# Patient Record
Sex: MALE | Race: BLACK OR AFRICAN AMERICAN | NOT HISPANIC OR LATINO | Employment: FULL TIME | URBAN - METROPOLITAN AREA
[De-identification: names, ages, dates, MRNs, and addresses within clinical notes are randomized per-mention and may not be internally consistent; named-entity substitution may affect disease eponyms.]

---

## 2017-04-25 RX ORDER — AMLODIPINE BESYLATE 10 MG/1
10 TABLET ORAL DAILY
Qty: 90 TABLET | Refills: 0 | Status: SHIPPED | OUTPATIENT
Start: 2017-04-25 | End: 2017-07-23 | Stop reason: SDUPTHER

## 2017-07-23 RX ORDER — AMLODIPINE BESYLATE 10 MG/1
TABLET ORAL
Qty: 90 TABLET | Refills: 0 | Status: SHIPPED | OUTPATIENT
Start: 2017-07-23 | End: 2017-08-10 | Stop reason: SDUPTHER

## 2017-07-24 RX ORDER — TADALAFIL 5 MG/1
TABLET, FILM COATED ORAL
Qty: 30 TABLET | Refills: 0 | Status: SHIPPED | OUTPATIENT
Start: 2017-07-24 | End: 2018-08-31 | Stop reason: SDUPTHER

## 2017-07-24 NOTE — TELEPHONE ENCOUNTER
Patient notified that an OV with his new PCP is required to obtain future refills.  Verbalized understanding and asked that his prescriptions be sent for a 30-day supply as his insurance does not cover prescriptions written for 90 days.   Sary, pharmacist, states that the patient picked up 30 tablets of amlodipine today due to insurance limitations.  She states that the patient will be able to obtain the remainder of the pills on his prescription in 30 day increments without a new order being submitted.

## 2017-08-09 NOTE — PROGRESS NOTES
This note was created by combination of typed  and Dragon dictation.  Transcription errors may be present.  If there are any questions, please contact me.    Assessment & Plan  Normal physical exam  -     Urinalysis; Future; Expected date: 08/10/2017  -     Comprehensive metabolic panel; Future; Expected date: 08/10/2017  -     Lipid panel; Future; Expected date: 08/10/2017    Vitiligo - managed by dermatology    Microhematuria-long-standing per the patient.  Has had CT urogram back 2008 negative, reports a history of cystoscopy probably around then though I don't have the final report.  Most recent was seen by urology.  He notes that this has been ongoing long-standing.  I favor that if this is stable I would just monitor.  Check urinalysis.  If red blood cells greater than 10 I would have him return to urology.    Essential hypertension-stable, refilled amlodipine, Hyzaar.  -     amlodipine (NORVASC) 10 MG tablet; Take 1 tablet (10 mg total) by mouth once daily.  Dispense: 90 tablet; Refill: 3  -     losartan-hydrochlorothiazide 100-25 mg (HYZAAR) 100-25 mg per tablet; Take 1 tablet by mouth once daily.  Dispense: 90 tablet; Refill: 3    Hordeolum externum of left lower eyelid-heat packs, expect slow but steady improvement.    Need for diphtheria-tetanus-pertussis (Tdap) vaccine, adult/adolescent  -     Tdap Vaccine        Medications Discontinued During This Encounter   Medication Reason    hydrochlorothiazide (HYDRODIURIL) 25 MG tablet Duplicate Order    losartan (COZAAR) 100 MG tablet Duplicate Order    doxazosin (CARDURA) 2 MG tablet     amlodipine (NORVASC) 10 MG tablet Reorder    losartan-hydrochlorothiazide 100-25 mg (HYZAAR) 100-25 mg per tablet Reorder       Follow-up: No Follow-up on file. if all normal plan for physical exam 1 year      =================================================================      Chief Complaint   Patient presents with    Annual Exam       ARACELI Abarca is a 41  y.o. male, last appointment with this clinic was Visit date not found.    Former pt of Dr. Baker and Dr. Arellano  HTN, amlodipine, hyzaar, takes regularly in the AM.  Checks daily.  Home cuff.  A little better than what we get on intake today.  Some white coat syndrome. Hx of doxazosin, was discontinued.   Obesity  Microhematuria, since college per the pt.  Hx of cystoscopy prior to his OV with Dr. Edwards.  11/24/2008 CT urogram likely kidney cysts.    Reports cystoscopy 2008 was unremarkable.  I don't see the report.    Stye on the left lower lid, x days.  Irritating but no vision changes.  Bumps on the posterior scalp.  Sees dermatology annually for vitiligo but overdue.     Patient Care Team:  Demarcus Nieves MD as PCP - General (Internal Medicine)  Shaniqua Edwards MD as Consulting Physician (Urology)  Erick Back MD as Consulting Physician (Dermatology)    Patient Active Problem List    Diagnosis Date Noted    Microhematuria 08/10/2017     11/24/2008 CT urogram likely kidney cysts.    Reports cystoscopy 2008 was unremarkable.  I don't see the report.      Essential hypertension 03/17/2013    Family history of diabetes mellitus 03/17/2013    Metatarsalgia 03/17/2013    Vitiligo 03/17/2013     Derm Dr. Back         PAST MEDICAL HISTORY:  Past Medical History:   Diagnosis Date    Hypertension        PAST SURGICAL HISTORY:  Past Surgical History:   Procedure Laterality Date    knee arthro Right     hx of meniscal tear     Family History   Problem Relation Age of Onset    Hypertension Mother     Hypertension Father     Melanoma Father     Diabetes Sister     Hypertension Brother     Benign prostatic hyperplasia Brother     Thyroid disease Sister     No Known Problems Son     No Known Problems Son     Autism Son        SOCIAL HISTORY:  Social History     Social History    Marital status:      Spouse name: N/A    Number of children: N/A    Years of education: N/A      Occupational History    [employer] - meter      Social History Main Topics    Smoking status: Never Smoker    Smokeless tobacco: Never Used    Alcohol use 0.0 oz/week      Comment: social    Drug use: No    Sexual activity: Yes     Partners: Female     Other Topics Concern    Not on file     Social History Narrative    [employer] graduate, he had played on the basketball team, and has worked offshore in safety and other capacities.         ALLERGIES AND MEDICATIONS: updated and reviewed.  Review of patient's allergies indicates:  No Known Allergies  Current Outpatient Prescriptions   Medication Sig Dispense Refill    amlodipine (NORVASC) 10 MG tablet Take 1 tablet (10 mg total) by mouth once daily. 90 tablet 3    CIALIS 5 mg tablet TAKE 1 TABLET BY MOUTH ONCE DAILY 30 tablet 0    losartan-hydrochlorothiazide 100-25 mg (HYZAAR) 100-25 mg per tablet Take 1 tablet by mouth once daily. 90 tablet 3     No current facility-administered medications for this visit.        Review of Systems   Constitutional: Negative for fever, malaise/fatigue and weight loss.   HENT: Negative for congestion.         History of present illness   Eyes: Negative for blurred vision and pain.   Respiratory: Negative for shortness of breath and wheezing.    Cardiovascular: Negative for chest pain, palpitations and leg swelling.   Gastrointestinal: Negative for abdominal pain, blood in stool, constipation, diarrhea and heartburn.   Genitourinary: Negative for dysuria, hematuria and urgency.   Musculoskeletal: Negative for joint pain.   Skin: Positive for rash.        Vitiligo followed by dermatology  Gets occasional dermatitis on his legs  Gets papules on the back of his scalp to get inflamed sometimes.  Has had steroid injections done with dermatology in the past.   Neurological: Negative for tingling, focal weakness, weakness and headaches.   Psychiatric/Behavioral: Negative for depression. The patient is not nervous/anxious.   "      Physical Exam   Vitals:    08/10/17 1049   BP: 134/84   Pulse: 78   Temp: 98 °F (36.7 °C)   SpO2: 99%   Weight: 132.2 kg (291 lb 7.2 oz)   Height: 6' 6" (1.981 m)    Body mass index is 33.68 kg/m².  Weight: 132.2 kg (291 lb 7.2 oz)   Height: 6' 6" (198.1 cm)     Physical Exam   Constitutional: He is oriented to person, place, and time. He appears well-developed and well-nourished.   HENT:   Right Ear: Tympanic membrane and external ear normal.   Left Ear: Tympanic membrane and external ear normal.   Nose: Nose normal.   Mouth/Throat: Oropharynx is clear and moist.   Eyes: EOM are normal.   The left eye lower lid on the border with small area of erythema, small little papule, no exudate.  The inside of the lid, with erythema and a small patch and very small area of induration.  The scleral conjunctiva is unremarkable   Neck: Trachea normal. Carotid bruit is not present. No thyroid mass and no thyromegaly present.   Cardiovascular: Normal rate, regular rhythm, S1 normal, S2 normal, normal heart sounds and intact distal pulses.    No murmur heard.  Pulmonary/Chest: Effort normal and breath sounds normal.   Abdominal: Soft. He exhibits no mass. There is no splenomegaly or hepatomegaly. There is no tenderness.   Genitourinary: Rectum normal and prostate normal.   Musculoskeletal: He exhibits no edema or deformity.   Lymphadenopathy:     He has no cervical adenopathy.     He has no axillary adenopathy.   Neurological: He is alert and oriented to person, place, and time. He has normal strength and normal reflexes. No cranial nerve deficit or sensory deficit.   Skin: Skin is warm and dry. No rash (on exposed skin) noted.   On exposed skin scattered patches of vitiligo  Posterior scalp with small papules nonfluctuant nontender noninflamed, several that are scattered, at max diameter 1 cm     Psychiatric: He has a normal mood and affect. His speech is normal and behavior is normal. Thought content normal.     "

## 2017-08-10 ENCOUNTER — LAB VISIT (OUTPATIENT)
Dept: LAB | Facility: HOSPITAL | Age: 42
End: 2017-08-10
Attending: INTERNAL MEDICINE
Payer: COMMERCIAL

## 2017-08-10 ENCOUNTER — OFFICE VISIT (OUTPATIENT)
Dept: FAMILY MEDICINE | Facility: CLINIC | Age: 42
End: 2017-08-10
Payer: COMMERCIAL

## 2017-08-10 VITALS
HEART RATE: 78 BPM | BODY MASS INDEX: 33.72 KG/M2 | HEIGHT: 78 IN | WEIGHT: 291.44 LBS | DIASTOLIC BLOOD PRESSURE: 84 MMHG | OXYGEN SATURATION: 99 % | TEMPERATURE: 98 F | SYSTOLIC BLOOD PRESSURE: 134 MMHG

## 2017-08-10 DIAGNOSIS — Z00.00 NORMAL PHYSICAL EXAM: Primary | ICD-10-CM

## 2017-08-10 DIAGNOSIS — Z00.00 NORMAL PHYSICAL EXAM: ICD-10-CM

## 2017-08-10 DIAGNOSIS — I10 ESSENTIAL HYPERTENSION: ICD-10-CM

## 2017-08-10 DIAGNOSIS — H00.015 HORDEOLUM EXTERNUM OF LEFT LOWER EYELID: ICD-10-CM

## 2017-08-10 DIAGNOSIS — R31.29 MICROHEMATURIA: ICD-10-CM

## 2017-08-10 DIAGNOSIS — Z23 NEED FOR DIPHTHERIA-TETANUS-PERTUSSIS (TDAP) VACCINE, ADULT/ADOLESCENT: ICD-10-CM

## 2017-08-10 DIAGNOSIS — L80 VITILIGO: ICD-10-CM

## 2017-08-10 LAB
ALBUMIN SERPL BCP-MCNC: 4 G/DL
ALP SERPL-CCNC: 56 U/L
ALT SERPL W/O P-5'-P-CCNC: 16 U/L
ANION GAP SERPL CALC-SCNC: 10 MMOL/L
AST SERPL-CCNC: 16 U/L
BILIRUB SERPL-MCNC: 1.3 MG/DL
BUN SERPL-MCNC: 13 MG/DL
CALCIUM SERPL-MCNC: 9.4 MG/DL
CHLORIDE SERPL-SCNC: 103 MMOL/L
CHOLEST/HDLC SERPL: 3.4 {RATIO}
CO2 SERPL-SCNC: 25 MMOL/L
CREAT SERPL-MCNC: 1 MG/DL
EST. GFR  (AFRICAN AMERICAN): >60 ML/MIN/1.73 M^2
EST. GFR  (NON AFRICAN AMERICAN): >60 ML/MIN/1.73 M^2
GLUCOSE SERPL-MCNC: 93 MG/DL
HDL/CHOLESTEROL RATIO: 29.2 %
HDLC SERPL-MCNC: 219 MG/DL
HDLC SERPL-MCNC: 64 MG/DL
LDLC SERPL CALC-MCNC: 135.2 MG/DL
NONHDLC SERPL-MCNC: 155 MG/DL
POTASSIUM SERPL-SCNC: 3.9 MMOL/L
PROT SERPL-MCNC: 7.2 G/DL
SODIUM SERPL-SCNC: 138 MMOL/L
TRIGL SERPL-MCNC: 99 MG/DL

## 2017-08-10 PROCEDURE — 36415 COLL VENOUS BLD VENIPUNCTURE: CPT | Mod: PO

## 2017-08-10 PROCEDURE — 99396 PREV VISIT EST AGE 40-64: CPT | Mod: 25,S$GLB,, | Performed by: INTERNAL MEDICINE

## 2017-08-10 PROCEDURE — 80061 LIPID PANEL: CPT

## 2017-08-10 PROCEDURE — 99999 PR PBB SHADOW E&M-EST. PATIENT-LVL III: CPT | Mod: PBBFAC,,, | Performed by: INTERNAL MEDICINE

## 2017-08-10 PROCEDURE — 90471 IMMUNIZATION ADMIN: CPT | Mod: S$GLB,,, | Performed by: INTERNAL MEDICINE

## 2017-08-10 PROCEDURE — 80053 COMPREHEN METABOLIC PANEL: CPT

## 2017-08-10 PROCEDURE — 90715 TDAP VACCINE 7 YRS/> IM: CPT | Mod: S$GLB,,, | Performed by: INTERNAL MEDICINE

## 2017-08-10 RX ORDER — AMLODIPINE BESYLATE 10 MG/1
10 TABLET ORAL DAILY
Qty: 90 TABLET | Refills: 3 | Status: SHIPPED | OUTPATIENT
Start: 2017-08-10 | End: 2017-10-16

## 2017-08-10 RX ORDER — LOSARTAN POTASSIUM AND HYDROCHLOROTHIAZIDE 25; 100 MG/1; MG/1
1 TABLET ORAL DAILY
Qty: 90 TABLET | Refills: 3 | Status: SHIPPED | OUTPATIENT
Start: 2017-08-10 | End: 2018-08-27 | Stop reason: SDUPTHER

## 2017-08-15 NOTE — PROGRESS NOTES
UA with 2 RBCs.  Has had workup for microhematuria in the past.   CMP WNL  FLP WNL  No changes.  PEx 1 year.

## 2017-10-16 RX ORDER — AMLODIPINE BESYLATE 10 MG/1
TABLET ORAL
Qty: 90 TABLET | Refills: 0 | Status: SHIPPED | OUTPATIENT
Start: 2017-10-16 | End: 2018-01-16 | Stop reason: SDUPTHER

## 2018-01-16 RX ORDER — AMLODIPINE BESYLATE 10 MG/1
TABLET ORAL
Qty: 90 TABLET | Refills: 1 | Status: SHIPPED | OUTPATIENT
Start: 2018-01-16 | End: 2018-07-16 | Stop reason: SDUPTHER

## 2018-07-16 DIAGNOSIS — I10 ESSENTIAL HYPERTENSION: Primary | ICD-10-CM

## 2018-07-16 RX ORDER — AMLODIPINE BESYLATE 10 MG/1
TABLET ORAL
Qty: 60 TABLET | Refills: 0 | Status: SHIPPED | OUTPATIENT
Start: 2018-07-16 | End: 2018-08-31 | Stop reason: SDUPTHER

## 2018-07-16 NOTE — TELEPHONE ENCOUNTER
Two months provided patient is due for his annual exam with Dr. Nieves in August, please schedule.    Thanks!  AMAURY Camara

## 2018-08-14 DIAGNOSIS — I10 ESSENTIAL HYPERTENSION: ICD-10-CM

## 2018-08-14 RX ORDER — AMLODIPINE BESYLATE 10 MG/1
TABLET ORAL
Qty: 90 TABLET | Refills: 0 | Status: SHIPPED | OUTPATIENT
Start: 2018-08-14 | End: 2018-08-31 | Stop reason: SDUPTHER

## 2018-08-14 NOTE — TELEPHONE ENCOUNTER
Left a message letting the patient know that his refill had been approved and before his next refill he will have to schedule an OV and labs.

## 2018-08-27 DIAGNOSIS — I10 ESSENTIAL HYPERTENSION: ICD-10-CM

## 2018-08-27 RX ORDER — LOSARTAN POTASSIUM AND HYDROCHLOROTHIAZIDE 25; 100 MG/1; MG/1
TABLET ORAL
Qty: 90 TABLET | Refills: 0 | Status: SHIPPED | OUTPATIENT
Start: 2018-08-27 | End: 2018-08-31 | Stop reason: SDUPTHER

## 2018-08-30 NOTE — PROGRESS NOTES
This note was created by combination of typed  and Dragon dictation.  Transcription errors may be present.  If there are any questions, please contact me.    Assessment & Plan:   Normal physical exam    Essential hypertension  -stable on current regimen, good blood pressure today, but no changes, refilled losartan HCT and amlodipine to pharmacy.  Check lipid, metabolic panel  -     Comprehensive metabolic panel; Future; Expected date: 08/31/2018  -     Lipid panel; Future; Expected date: 08/31/2018  -     losartan-hydrochlorothiazide 100-25 mg (HYZAAR) 100-25 mg per tablet; Take 1 tablet by mouth once daily.  Dispense: 90 tablet; Refill: 3  -     amLODIPine (NORVASC) 10 MG tablet; Take 1 tablet (10 mg total) by mouth once daily.  Dispense: 90 tablet; Refill: 3    Male erectile disorder  -takes Cialis on an as-needed basis, refill to pharmacy.  -     tadalafil (CIALIS) 5 MG tablet; Take 1 tablet (5 mg total) by mouth once daily.  Dispense: 30 tablet; Refill: 11        Medications Discontinued During This Encounter   Medication Reason    amLODIPine (NORVASC) 10 MG tablet Duplicate Order    losartan-hydrochlorothiazide 100-25 mg (HYZAAR) 100-25 mg per tablet Reorder    amLODIPine (NORVASC) 10 MG tablet Reorder    CIALIS 5 mg tablet Reorder     Modified Medications    Modified Medication Previous Medication    AMLODIPINE (NORVASC) 10 MG TABLET amLODIPine (NORVASC) 10 MG tablet       Take 1 tablet (10 mg total) by mouth once daily.    TAKE 1 TABLET BY MOUTH EVERY DAY    LOSARTAN-HYDROCHLOROTHIAZIDE 100-25 MG (HYZAAR) 100-25 MG PER TABLET losartan-hydrochlorothiazide 100-25 mg (HYZAAR) 100-25 mg per tablet       Take 1 tablet by mouth once daily.    TAKE 1 TABLET BY MOUTH EVERY DAY    TADALAFIL (CIALIS) 5 MG TABLET CIALIS 5 mg tablet       Take 1 tablet (5 mg total) by mouth once daily.    TAKE 1 TABLET BY MOUTH ONCE DAILY     New Prescriptions    No medications on file       Follow Up: No Follow-up on file.  PEx 1 year.        Subjective:     Chief Complaint   Patient presents with    Annual Exam       ARACELI Abarca is a 42 y.o. male, last appointment with this clinic was Visit date not found.    HTN, amlodipine, hyzaar. Hx of doxazosin, was discontinued.   Obesity  Microhematuria, since college per the pt.  Hx of cystoscopy prior to his OV with Dr. Edwards.  11/24/2008 CT urogram likely kidney cysts.    12/8/2008 cystoscopy negative with Dr. Cooper - reviewed in legacy documents.    Labs 8/2017 stable.    Takes the cialis PRN.    Denies obvious SE of medications.  Maybe sometimes sensation of bloating.  Works outside so sweats a lot. No leg cramping. Outside BP readings by recall - 130s/80s in the morning.     Mother with ovarian tumor, unclear the dx at this time. She has f/u next week for this.    Going to see Dr. Back - has some cyst like lesions on the back of the scalp, they're tender today and inflamed.  Has appt later today.     Physical activity - work and in addition to that weight lifting and walking TIW.  Diet - eats vegetables and fruits.  Weakness for sweets. No sweet drinks but yes gatorade.    Patient Care Team:  Demarcus Nieves MD as PCP - General (Internal Medicine)  Shaniqua Edwards MD as Consulting Physician (Urology)  Erick Back MD as Consulting Physician (Dermatology)    Patient Active Problem List    Diagnosis Date Noted    Microhematuria 08/10/2017     11/24/2008 CT urogram likely kidney cysts.    Reports cystoscopy 2008 was unremarkable.  I don't see the report.      Essential hypertension 03/17/2013    Family history of diabetes mellitus 03/17/2013    Metatarsalgia 03/17/2013    Vitiligo 03/17/2013     Derm Dr. Back         PAST MEDICAL HISTORY:  Past Medical History:   Diagnosis Date    Hypertension        PAST SURGICAL HISTORY:  Past Surgical History:   Procedure Laterality Date    knee arthro Right     hx of meniscal tear     Family History   Problem Relation Age of  Onset    Hypertension Mother     Ovarian cysts Mother     Hypertension Father     Melanoma Father     Diabetes Sister     Hypertension Brother     Benign prostatic hyperplasia Brother     Gallbladder disease Brother     Thyroid disease Sister     No Known Problems Son     No Known Problems Son     Autism Son        SOCIAL HISTORY:  Social History     Socioeconomic History    Marital status:      Spouse name: Not on file    Number of children: Not on file    Years of education: Not on file    Highest education level: Not on file   Social Needs    Financial resource strain: Not on file    Food insecurity - worry: Not on file    Food insecurity - inability: Not on file    Transportation needs - medical: Not on file    Transportation needs - non-medical: Not on file   Occupational History    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         ALLERGIES AND MEDICATIONS: updated and reviewed.  Review of patient's allergies indicates:  No Known Allergies  Current Outpatient Medications   Medication Sig Dispense Refill    amLODIPine (NORVASC) 10 MG tablet TAKE 1 TABLET BY MOUTH EVERY DAY 60 tablet 0    losartan-hydrochlorothiazide 100-25 mg (HYZAAR) 100-25 mg per tablet TAKE 1 TABLET BY MOUTH EVERY DAY 90 tablet 0    CIALIS 5 mg tablet TAKE 1 TABLET BY MOUTH ONCE DAILY 30 tablet 0     No current facility-administered medications for this visit.        Review of Systems   Constitutional: Negative for fever, malaise/fatigue and weight loss.   HENT: Negative for congestion.    Eyes:  "Negative for blurred vision and pain.   Respiratory: Negative for shortness of breath and wheezing.    Cardiovascular: Negative for chest pain, palpitations and leg swelling.   Gastrointestinal: Positive for constipation. Negative for abdominal pain, blood in stool, diarrhea and heartburn.        Intermittent constipation   Genitourinary: Negative for dysuria, hematuria and urgency.   Musculoskeletal: Positive for joint pain.        Right knee occasional pain and swelling; hx of ortho eval and hx of surgical repair years ago   Neurological: Negative for tingling, focal weakness, weakness and headaches.   Psychiatric/Behavioral: Negative for depression. The patient is not nervous/anxious.        Objective:   Physical Exam   Vitals:    08/31/18 0931 08/31/18 0949   BP: (!) 130/90 118/78   BP Location:  Left arm   Patient Position:  Sitting   BP Method:  Large (Manual)   Pulse: 76    Temp: 98.4 °F (36.9 °C)    SpO2: 96%    Weight: 133.5 kg (294 lb 5 oz)    Height: 6' 6" (1.981 m)     Body mass index is 34.01 kg/m².  Weight: 133.5 kg (294 lb 5 oz)   Height: 6' 6" (198.1 cm)     Physical Exam   Constitutional: He is oriented to person, place, and time. He appears well-developed and well-nourished.   HENT:   Right Ear: Tympanic membrane and external ear normal.   Left Ear: Tympanic membrane and external ear normal.   Nose: Nose normal.   Mouth/Throat: Oropharynx is clear and moist.   Eyes: EOM are normal.   Neck: Trachea normal. Carotid bruit is not present. No thyroid mass and no thyromegaly present.   Cardiovascular: Normal rate, regular rhythm, S1 normal, S2 normal, normal heart sounds and intact distal pulses.   No murmur heard.  Pulmonary/Chest: Effort normal and breath sounds normal.   Abdominal: Soft. He exhibits no mass. There is no splenomegaly or hepatomegaly. There is no tenderness.   Musculoskeletal: He exhibits no edema or deformity.   Right knee crepitus with full extension but no effusion   Lymphadenopathy: "     He has no cervical adenopathy.     He has no axillary adenopathy.   Neurological: He is alert and oriented to person, place, and time. He has normal strength and normal reflexes. No cranial nerve deficit or sensory deficit.   Skin: Skin is warm and dry. No rash (on exposed skin) noted.   On exposed skin  The like a most pronounced on the hands, fingertips, a few spots on the angles of the lips  On the posterior of his scalp, hypopigmented raised cystic like areas, without visible comedone each approximately 1 cm in size, 2 of them that I see   Psychiatric: He has a normal mood and affect. His speech is normal and behavior is normal. Thought content normal.

## 2018-08-31 ENCOUNTER — OFFICE VISIT (OUTPATIENT)
Dept: FAMILY MEDICINE | Facility: CLINIC | Age: 43
End: 2018-08-31
Payer: COMMERCIAL

## 2018-08-31 ENCOUNTER — LAB VISIT (OUTPATIENT)
Dept: LAB | Facility: HOSPITAL | Age: 43
End: 2018-08-31
Attending: INTERNAL MEDICINE
Payer: COMMERCIAL

## 2018-08-31 VITALS
TEMPERATURE: 98 F | BODY MASS INDEX: 34.05 KG/M2 | DIASTOLIC BLOOD PRESSURE: 78 MMHG | HEART RATE: 76 BPM | OXYGEN SATURATION: 96 % | HEIGHT: 78 IN | SYSTOLIC BLOOD PRESSURE: 118 MMHG | WEIGHT: 294.31 LBS

## 2018-08-31 DIAGNOSIS — I10 ESSENTIAL HYPERTENSION: ICD-10-CM

## 2018-08-31 DIAGNOSIS — N52.9 MALE ERECTILE DISORDER: ICD-10-CM

## 2018-08-31 DIAGNOSIS — Z00.00 NORMAL PHYSICAL EXAM: Primary | ICD-10-CM

## 2018-08-31 LAB
ALBUMIN SERPL BCP-MCNC: 4.1 G/DL
ALP SERPL-CCNC: 53 U/L
ALT SERPL W/O P-5'-P-CCNC: 13 U/L
ANION GAP SERPL CALC-SCNC: 6 MMOL/L
AST SERPL-CCNC: 15 U/L
BILIRUB SERPL-MCNC: 0.7 MG/DL
BUN SERPL-MCNC: 13 MG/DL
CALCIUM SERPL-MCNC: 9.6 MG/DL
CHLORIDE SERPL-SCNC: 105 MMOL/L
CHOLEST SERPL-MCNC: 198 MG/DL
CHOLEST/HDLC SERPL: 4 {RATIO}
CO2 SERPL-SCNC: 28 MMOL/L
CREAT SERPL-MCNC: 1 MG/DL
EST. GFR  (AFRICAN AMERICAN): >60 ML/MIN/1.73 M^2
EST. GFR  (NON AFRICAN AMERICAN): >60 ML/MIN/1.73 M^2
GLUCOSE SERPL-MCNC: 97 MG/DL
HDLC SERPL-MCNC: 50 MG/DL
HDLC SERPL: 25.3 %
LDLC SERPL CALC-MCNC: 136.6 MG/DL
NONHDLC SERPL-MCNC: 148 MG/DL
POTASSIUM SERPL-SCNC: 4.3 MMOL/L
PROT SERPL-MCNC: 7.1 G/DL
SODIUM SERPL-SCNC: 139 MMOL/L
TRIGL SERPL-MCNC: 57 MG/DL

## 2018-08-31 PROCEDURE — 80053 COMPREHEN METABOLIC PANEL: CPT

## 2018-08-31 PROCEDURE — 99999 PR PBB SHADOW E&M-EST. PATIENT-LVL III: CPT | Mod: PBBFAC,,, | Performed by: INTERNAL MEDICINE

## 2018-08-31 PROCEDURE — 80061 LIPID PANEL: CPT

## 2018-08-31 PROCEDURE — 36415 COLL VENOUS BLD VENIPUNCTURE: CPT | Mod: PO

## 2018-08-31 PROCEDURE — 99396 PREV VISIT EST AGE 40-64: CPT | Mod: S$GLB,,, | Performed by: INTERNAL MEDICINE

## 2018-08-31 RX ORDER — TADALAFIL 5 MG/1
5 TABLET ORAL DAILY
Qty: 30 TABLET | Refills: 11 | Status: SHIPPED | OUTPATIENT
Start: 2018-08-31 | End: 2019-09-25 | Stop reason: ALTCHOICE

## 2018-08-31 RX ORDER — AMLODIPINE BESYLATE 10 MG/1
10 TABLET ORAL DAILY
Qty: 90 TABLET | Refills: 3 | Status: SHIPPED | OUTPATIENT
Start: 2018-08-31 | End: 2019-09-25 | Stop reason: SDUPTHER

## 2018-08-31 RX ORDER — LOSARTAN POTASSIUM AND HYDROCHLOROTHIAZIDE 25; 100 MG/1; MG/1
1 TABLET ORAL DAILY
Qty: 90 TABLET | Refills: 3 | Status: SHIPPED | OUTPATIENT
Start: 2018-08-31 | End: 2019-09-25 | Stop reason: SDUPTHER

## 2018-09-03 NOTE — PROGRESS NOTES
The 10-year ASCVD risk score (Amilcar FRANCO Jr., et al., 2013) is: 5%    Labs stable lipid better compared to last year  Results to pt no change in regimen PEx 1 year.

## 2018-11-15 DIAGNOSIS — I10 ESSENTIAL HYPERTENSION: ICD-10-CM

## 2018-11-15 RX ORDER — AMLODIPINE BESYLATE 10 MG/1
TABLET ORAL
Qty: 90 TABLET | Refills: 3 | Status: SHIPPED | OUTPATIENT
Start: 2018-11-15 | End: 2019-09-25 | Stop reason: SDUPTHER

## 2018-12-03 DIAGNOSIS — I10 ESSENTIAL HYPERTENSION: ICD-10-CM

## 2018-12-03 RX ORDER — LOSARTAN POTASSIUM AND HYDROCHLOROTHIAZIDE 25; 100 MG/1; MG/1
TABLET ORAL
Qty: 90 TABLET | Refills: 2 | Status: SHIPPED | OUTPATIENT
Start: 2018-12-03 | End: 2019-09-03 | Stop reason: SDUPTHER

## 2019-09-03 DIAGNOSIS — I10 ESSENTIAL HYPERTENSION: ICD-10-CM

## 2019-09-03 RX ORDER — LOSARTAN POTASSIUM AND HYDROCHLOROTHIAZIDE 25; 100 MG/1; MG/1
TABLET ORAL
Qty: 90 TABLET | Refills: 0 | Status: SHIPPED | OUTPATIENT
Start: 2019-09-03 | End: 2019-09-25 | Stop reason: SDUPTHER

## 2019-09-24 NOTE — PROGRESS NOTES
This note was created by combination of typed  and Dragon dictation.  Transcription errors may be present.  If there are any questions, please contact me.    Assessment & Plan:   Normal physical exam.  -check labs.  Has made significant improvements in diet with significant weight loss and I congratulated him on his success.  -     CBC auto differential; Future; Expected date: 09/25/2019  -     Comprehensive metabolic panel; Future; Expected date: 09/25/2019  -     Lipid panel; Future; Expected date: 09/25/2019    Essential hypertension  -blood pressures are good, sounds like he is getting a little lightheaded.  Significant weight loss.  Stay on losartan HCT.  Decrease amlodipine to 5.  Nurse visit 1 month for BP check.  If still good, less than 130/80, trial of stopping amlodipine  -     losartan-hydrochlorothiazide 100-25 mg (HYZAAR) 100-25 mg per tablet; Take 1 tablet by mouth once daily.  Dispense: 90 tablet; Refill: 3  -     amLODIPine (NORVASC) 5 MG tablet; 5 mg daily. Reduced dose  Dispense: 30 tablet; Refill: 5    Microhematuria ever since his 20s.  -has had negative workup including CT, and cystoscopy.  Check urinalysis to make sure no worsening  -     Urinalysis; Future; Expected date: 09/25/2019    Male erectile disorder  -he notes erectile dysfunction is improved ever since losing weight but still having some issues and would like to try the Cialis.  I have sent in a prescription and also gave him [email or web address] coupon.  I anticipate this won't be covered by insurance.  -     tadalafil (CIALIS) 5 MG tablet; Take 1 tablet (5 mg total) by mouth once daily.  Dispense: 30 tablet; Refill: 11    Needs flu shot  -     Influenza - Quadrivalent (PF)    Rash  -his symptoms do not sound consistent with shingles but the distribution is somewhat suspicious.  Looks like it is old.  Fungal?  Inflammatory?  Does not look like neoplasm.  Trial of topical Lotrisone.  -     clotrimazole-betamethasone 1-0.05%  (LOTRISONE) cream; Apply topically 2 (two) times daily.  Dispense: 15 g; Refill: 1        Medications Discontinued During This Encounter   Medication Reason    amLODIPine (NORVASC) 10 MG tablet Duplicate Order    losartan-hydrochlorothiazide 100-25 mg (HYZAAR) 100-25 mg per tablet Duplicate Order    tadalafil (CIALIS) 5 MG tablet Therapy completed    losartan-hydrochlorothiazide 100-25 mg (HYZAAR) 100-25 mg per tablet Reorder    amLODIPine (NORVASC) 10 MG tablet Reorder       meds sent this encounter:  Medications Ordered This Encounter   Medications    amLODIPine (NORVASC) 5 MG tablet     Si mg daily. Reduced dose     Dispense:  30 tablet     Refill:  5    clotrimazole-betamethasone 1-0.05% (LOTRISONE) cream     Sig: Apply topically 2 (two) times daily.     Dispense:  15 g     Refill:  1    losartan-hydrochlorothiazide 100-25 mg (HYZAAR) 100-25 mg per tablet     Sig: Take 1 tablet by mouth once daily.     Dispense:  90 tablet     Refill:  3    tadalafil (CIALIS) 5 MG tablet     Sig: Take 1 tablet (5 mg total) by mouth once daily.     Dispense:  30 tablet     Refill:  11       Follow Up: No follow-ups on file.  Nurse visit 1 month for BP check.  If good, stop amlodipine, and then have him return for nurse visit again a month later.  PEx 1 year.    Subjective:     Chief Complaint   Patient presents with    Annual Exam       ARACELI Abarca is a 43 y.o. male, last appointment with this clinic was Visit date not found.    Last seen last year  Hypertension, stable at that time no changes  Ten year risk score less than 7.5%  History of micro hematuria chronic with history of workup  2017 UA normal.    Diet modification for the past 6 months - no dairy, no meat.  More salad, fruit, etc.   Significant weight loss.   Notes sometimes with bending over for period of time - lightheaded. Checks BP in the AM. By recall in the 110s    Not using cialis. Expensive. But would like to re-order the rx.  Improved sx with  weight loss but would like to try.    Spots on skin, the right thigh, x > 1 month, painless, no drainage.    Patient Care Team:  Demarcus Nieves MD as PCP - General (Internal Medicine)  Shaniqua Edwards MD as Consulting Physician (Urology)  Erick Back MD as Consulting Physician (Dermatology)    Patient Active Problem List    Diagnosis Date Noted    Microhematuria ever since his 20s. 08/10/2017     11/24/2008 CT urogram likely kidney cysts.    Reports cystoscopy 2008 was unremarkable.  I don't see the report.      Essential hypertension 03/17/2013    Family history of diabetes mellitus 03/17/2013    Metatarsalgia 03/17/2013    Vitiligo 03/17/2013     Derm Dr. 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   Social Needs    Financial resource strain: Not on file    Food insecurity:     Worry: Not on file     Inability: Not on file    Transportation needs:     Medical: Not on file     Non-medical: Not on file   Tobacco Use    Smoking status: Never Smoker    Smokeless tobacco: Never Used   Substance and Sexual Activity    Alcohol use: Yes     Alcohol/week: 0.0 standard drinks     Comment: social    Drug use: No    Sexual activity: Yes     Partners: Female   Lifestyle    Physical activity:     Days per week: Not on file     Minutes per session: Not on file    Stress: Not on file   Relationships    Social connections:     Talks on phone: Not on file     Gets together: Not on file     Attends Anabaptism service: Not on file     Active member  of club or organization: Not on file     Attends meetings of clubs or organizations: Not on file     Relationship status: Not on file   Other Topics Concern    Not on file   Social History Narrative    [employer] graduate, he had played on the basketball team, and has worked offshore in safety and other capacities.         ALLERGIES AND MEDICATIONS: updated and reviewed.  Review of patient's allergies indicates:  No Known Allergies  Current Outpatient Medications   Medication Sig Dispense Refill    amLODIPine (NORVASC) 10 MG tablet Take 1 tablet (10 mg total) by mouth once daily. 90 tablet 3    amLODIPine (NORVASC) 10 MG tablet TAKE 1 TABLET(10 MG) BY MOUTH EVERY DAY 90 tablet 3    losartan-hydrochlorothiazide 100-25 mg (HYZAAR) 100-25 mg per tablet Take 1 tablet by mouth once daily. 90 tablet 3    losartan-hydrochlorothiazide 100-25 mg (HYZAAR) 100-25 mg per tablet TAKE 1 TABLET BY MOUTH EVERY DAY 90 tablet 0    tadalafil (CIALIS) 5 MG tablet Take 1 tablet (5 mg total) by mouth once daily. 30 tablet 11     No current facility-administered medications for this visit.        Review of Systems   Constitutional: Negative for fever, malaise/fatigue and weight loss.   HENT: Negative for congestion.    Eyes: Negative for blurred vision and pain.   Respiratory: Negative for shortness of breath and wheezing.    Cardiovascular: Negative for chest pain, palpitations and leg swelling.   Gastrointestinal: Negative for abdominal pain, blood in stool, constipation, diarrhea and heartburn.   Genitourinary: Negative for dysuria, hematuria and urgency.   Skin: Positive for rash.   Neurological: Negative for tingling, focal weakness, weakness and headaches.       Objective:   Physical Exam   Vitals:    09/25/19 0909   BP: 110/69   BP Location: Left arm   Patient Position: Sitting   BP Method: Large (Manual)   Pulse: 90   Temp: 97.8 °F (36.6 °C)   TempSrc: Oral   SpO2: 99%   Weight: 117.9 kg (259 lb 14.8 oz)   Height: 6'  "6" (1.981 m)    Body mass index is 30.04 kg/m².            Physical Exam   Constitutional: He is oriented to person, place, and time. He appears well-developed and well-nourished.   HENT:   Right Ear: Tympanic membrane and external ear normal.   Left Ear: Tympanic membrane and external ear normal.   Nose: Nose normal.   Mouth/Throat: Oropharynx is clear and moist.   Eyes: EOM are normal.   Neck: Trachea normal. Carotid bruit is not present. No thyroid mass and no thyromegaly present.   Cardiovascular: Normal rate, regular rhythm, S1 normal, S2 normal, normal heart sounds and intact distal pulses.   No murmur heard.  Pulmonary/Chest: Effort normal and breath sounds normal.   Abdominal: Soft. He exhibits no mass. There is no splenomegaly or hepatomegaly. There is no tenderness.   Musculoskeletal: He exhibits no edema or deformity.   Lymphadenopathy:     He has no cervical adenopathy.     He has no axillary adenopathy.   Neurological: He is alert and oriented to person, place, and time. He has normal strength and normal reflexes. No cranial nerve deficit or sensory deficit.   Skin: Skin is warm and dry. Rash (on exposed skin) noted.        On the right medial thigh, some small patches of what looks to be minimal scale, slightly raised, minimally indurated, hyperpigmented, well-defined.  No erythema no warmth no tenderness   Psychiatric: He has a normal mood and affect. His speech is normal and behavior is normal. Thought content normal.     "

## 2019-09-25 ENCOUNTER — LAB VISIT (OUTPATIENT)
Dept: LAB | Facility: HOSPITAL | Age: 44
End: 2019-09-25
Attending: INTERNAL MEDICINE
Payer: COMMERCIAL

## 2019-09-25 ENCOUNTER — OFFICE VISIT (OUTPATIENT)
Dept: FAMILY MEDICINE | Facility: CLINIC | Age: 44
End: 2019-09-25
Payer: COMMERCIAL

## 2019-09-25 VITALS
DIASTOLIC BLOOD PRESSURE: 69 MMHG | WEIGHT: 259.94 LBS | BODY MASS INDEX: 30.08 KG/M2 | HEART RATE: 90 BPM | OXYGEN SATURATION: 99 % | HEIGHT: 78 IN | TEMPERATURE: 98 F | SYSTOLIC BLOOD PRESSURE: 110 MMHG

## 2019-09-25 DIAGNOSIS — R31.29 MICROHEMATURIA: ICD-10-CM

## 2019-09-25 DIAGNOSIS — Z23 NEEDS FLU SHOT: ICD-10-CM

## 2019-09-25 DIAGNOSIS — I10 ESSENTIAL HYPERTENSION: ICD-10-CM

## 2019-09-25 DIAGNOSIS — Z00.00 NORMAL PHYSICAL EXAM: Primary | ICD-10-CM

## 2019-09-25 DIAGNOSIS — Z00.00 NORMAL PHYSICAL EXAM: ICD-10-CM

## 2019-09-25 DIAGNOSIS — N52.9 MALE ERECTILE DISORDER: ICD-10-CM

## 2019-09-25 DIAGNOSIS — R21 RASH: ICD-10-CM

## 2019-09-25 LAB
ALBUMIN SERPL BCP-MCNC: 4.3 G/DL (ref 3.5–5.2)
ALP SERPL-CCNC: 70 U/L (ref 55–135)
ALT SERPL W/O P-5'-P-CCNC: 12 U/L (ref 10–44)
ANION GAP SERPL CALC-SCNC: 7 MMOL/L (ref 8–16)
AST SERPL-CCNC: 15 U/L (ref 10–40)
BASOPHILS # BLD AUTO: 0.02 K/UL (ref 0–0.2)
BASOPHILS NFR BLD: 0.7 % (ref 0–1.9)
BILIRUB SERPL-MCNC: 0.9 MG/DL (ref 0.1–1)
BUN SERPL-MCNC: 7 MG/DL (ref 6–20)
CALCIUM SERPL-MCNC: 9.7 MG/DL (ref 8.7–10.5)
CHLORIDE SERPL-SCNC: 105 MMOL/L (ref 95–110)
CHOLEST SERPL-MCNC: 171 MG/DL (ref 120–199)
CHOLEST/HDLC SERPL: 3.3 {RATIO} (ref 2–5)
CO2 SERPL-SCNC: 29 MMOL/L (ref 23–29)
CREAT SERPL-MCNC: 0.9 MG/DL (ref 0.5–1.4)
DIFFERENTIAL METHOD: ABNORMAL
EOSINOPHIL # BLD AUTO: 0.1 K/UL (ref 0–0.5)
EOSINOPHIL NFR BLD: 3.2 % (ref 0–8)
ERYTHROCYTE [DISTWIDTH] IN BLOOD BY AUTOMATED COUNT: 14.6 % (ref 11.5–14.5)
EST. GFR  (AFRICAN AMERICAN): >60 ML/MIN/1.73 M^2
EST. GFR  (NON AFRICAN AMERICAN): >60 ML/MIN/1.73 M^2
GLUCOSE SERPL-MCNC: 90 MG/DL (ref 70–110)
HCT VFR BLD AUTO: 43.3 % (ref 40–54)
HDLC SERPL-MCNC: 52 MG/DL (ref 40–75)
HDLC SERPL: 30.4 % (ref 20–50)
HGB BLD-MCNC: 13.4 G/DL (ref 14–18)
IMM GRANULOCYTES # BLD AUTO: 0 K/UL (ref 0–0.04)
IMM GRANULOCYTES NFR BLD AUTO: 0 % (ref 0–0.5)
LDLC SERPL CALC-MCNC: 111.6 MG/DL (ref 63–159)
LYMPHOCYTES # BLD AUTO: 1.3 K/UL (ref 1–4.8)
LYMPHOCYTES NFR BLD: 44 % (ref 18–48)
MCH RBC QN AUTO: 27.7 PG (ref 27–31)
MCHC RBC AUTO-ENTMCNC: 30.9 G/DL (ref 32–36)
MCV RBC AUTO: 90 FL (ref 82–98)
MONOCYTES # BLD AUTO: 0.3 K/UL (ref 0.3–1)
MONOCYTES NFR BLD: 9.2 % (ref 4–15)
NEUTROPHILS # BLD AUTO: 1.2 K/UL (ref 1.8–7.7)
NEUTROPHILS NFR BLD: 42.9 % (ref 38–73)
NONHDLC SERPL-MCNC: 119 MG/DL
NRBC BLD-RTO: 0 /100 WBC
PLATELET # BLD AUTO: 234 K/UL (ref 150–350)
PMV BLD AUTO: 9.8 FL (ref 9.2–12.9)
POTASSIUM SERPL-SCNC: 4.2 MMOL/L (ref 3.5–5.1)
PROT SERPL-MCNC: 7.3 G/DL (ref 6–8.4)
RBC # BLD AUTO: 4.83 M/UL (ref 4.6–6.2)
SODIUM SERPL-SCNC: 141 MMOL/L (ref 136–145)
TRIGL SERPL-MCNC: 37 MG/DL (ref 30–150)
WBC # BLD AUTO: 2.84 K/UL (ref 3.9–12.7)

## 2019-09-25 PROCEDURE — 99999 PR PBB SHADOW E&M-EST. PATIENT-LVL IV: ICD-10-PCS | Mod: PBBFAC,,, | Performed by: INTERNAL MEDICINE

## 2019-09-25 PROCEDURE — 90686 IIV4 VACC NO PRSV 0.5 ML IM: CPT | Mod: S$GLB,,, | Performed by: INTERNAL MEDICINE

## 2019-09-25 PROCEDURE — 90686 FLU VACCINE (QUAD) GREATER THAN OR EQUAL TO 3YO PRESERVATIVE FREE IM: ICD-10-PCS | Mod: S$GLB,,, | Performed by: INTERNAL MEDICINE

## 2019-09-25 PROCEDURE — 99999 PR PBB SHADOW E&M-EST. PATIENT-LVL IV: CPT | Mod: PBBFAC,,, | Performed by: INTERNAL MEDICINE

## 2019-09-25 PROCEDURE — 99396 PREV VISIT EST AGE 40-64: CPT | Mod: 25,S$GLB,, | Performed by: INTERNAL MEDICINE

## 2019-09-25 PROCEDURE — 99396 PR PREVENTIVE VISIT,EST,40-64: ICD-10-PCS | Mod: 25,S$GLB,, | Performed by: INTERNAL MEDICINE

## 2019-09-25 PROCEDURE — 85025 COMPLETE CBC W/AUTO DIFF WBC: CPT

## 2019-09-25 PROCEDURE — 80061 LIPID PANEL: CPT

## 2019-09-25 PROCEDURE — 90471 IMMUNIZATION ADMIN: CPT | Mod: S$GLB,,, | Performed by: INTERNAL MEDICINE

## 2019-09-25 PROCEDURE — 90471 FLU VACCINE (QUAD) GREATER THAN OR EQUAL TO 3YO PRESERVATIVE FREE IM: ICD-10-PCS | Mod: S$GLB,,, | Performed by: INTERNAL MEDICINE

## 2019-09-25 PROCEDURE — 36415 COLL VENOUS BLD VENIPUNCTURE: CPT | Mod: PO

## 2019-09-25 PROCEDURE — 80053 COMPREHEN METABOLIC PANEL: CPT

## 2019-09-25 RX ORDER — AMLODIPINE BESYLATE 5 MG/1
TABLET ORAL
Qty: 30 TABLET | Refills: 5 | Status: SHIPPED | OUTPATIENT
Start: 2019-09-25 | End: 2019-10-25 | Stop reason: ALTCHOICE

## 2019-09-25 RX ORDER — CLOTRIMAZOLE AND BETAMETHASONE DIPROPIONATE 10; .64 MG/G; MG/G
CREAM TOPICAL 2 TIMES DAILY
Qty: 15 G | Refills: 1 | Status: SHIPPED | OUTPATIENT
Start: 2019-09-25 | End: 2022-05-06

## 2019-09-25 RX ORDER — TADALAFIL 5 MG/1
5 TABLET ORAL DAILY
Qty: 30 TABLET | Refills: 11 | Status: CANCELLED | OUTPATIENT
Start: 2019-09-25

## 2019-09-25 RX ORDER — TADALAFIL 5 MG/1
5 TABLET ORAL DAILY
Qty: 30 TABLET | Refills: 11 | Status: SHIPPED | OUTPATIENT
Start: 2019-09-25 | End: 2020-02-05 | Stop reason: ALTCHOICE

## 2019-09-25 RX ORDER — LOSARTAN POTASSIUM AND HYDROCHLOROTHIAZIDE 25; 100 MG/1; MG/1
1 TABLET ORAL DAILY
Qty: 90 TABLET | Refills: 3 | Status: SHIPPED | OUTPATIENT
Start: 2019-09-25 | End: 2020-02-05 | Stop reason: ALTCHOICE

## 2019-09-25 NOTE — PATIENT INSTRUCTIONS
Start B12 supplement - because you do not eat meat.     We'll try the lower dose amlodipine at 5 mg    Repeat BP in 1 month. If still < 130/80, will try stopping the amlodipine.

## 2019-09-26 NOTE — PROGRESS NOTES
CBC stable  CMP normal  Lipid profile even better compared to previous.  Therapeutic lifestyle modification  Urinalysis unremarkable.  Done for history of hematuria with negative workup  Results to patient via my [facility].  At his visit we had cut down his amlodipine.  Plan is nurse visit to check on lower dose and if still in good range then stop amlodipine and then repeat nurse visit to check BP off of amlodipine.  Otherwise physical exam 1 year

## 2019-10-25 ENCOUNTER — CLINICAL SUPPORT (OUTPATIENT)
Dept: FAMILY MEDICINE | Facility: CLINIC | Age: 44
End: 2019-10-25
Payer: COMMERCIAL

## 2019-10-25 ENCOUNTER — PATIENT MESSAGE (OUTPATIENT)
Dept: FAMILY MEDICINE | Facility: CLINIC | Age: 44
End: 2019-10-25

## 2019-10-25 VITALS — HEART RATE: 91 BPM | SYSTOLIC BLOOD PRESSURE: 114 MMHG | DIASTOLIC BLOOD PRESSURE: 84 MMHG | OXYGEN SATURATION: 97 %

## 2019-10-25 DIAGNOSIS — I10 ESSENTIAL HYPERTENSION: Primary | ICD-10-CM

## 2019-10-25 PROCEDURE — 99999 PR PBB SHADOW E&M-EST. PATIENT-LVL II: CPT | Mod: PBBFAC,,,

## 2019-10-25 PROCEDURE — 99499 UNLISTED E&M SERVICE: CPT | Mod: S$GLB,,, | Performed by: INTERNAL MEDICINE

## 2019-10-25 PROCEDURE — 99999 PR PBB SHADOW E&M-EST. PATIENT-LVL II: ICD-10-PCS | Mod: PBBFAC,,,

## 2019-10-25 PROCEDURE — 99499 NO LOS: ICD-10-PCS | Mod: S$GLB,,, | Performed by: INTERNAL MEDICINE

## 2019-10-25 NOTE — PROGRESS NOTES
BP good,plan was to try stopping amlodipine if < 130/80.    Please call pt - stop the amlodipine. Stay on the losartan/hCTZ. Nurse visit 1 month for BP check please.

## 2019-10-25 NOTE — PROGRESS NOTES
Tevin Velazquez 44 y.o. male is here today for Blood Pressure check.   History of HTN yes.    Review of patient's allergies indicates:  No Known Allergies  Creatinine   Date Value Ref Range Status   09/25/2019 0.9 0.5 - 1.4 mg/dL Final     Sodium   Date Value Ref Range Status   09/25/2019 141 136 - 145 mmol/L Final     Potassium   Date Value Ref Range Status   09/25/2019 4.2 3.5 - 5.1 mmol/L Final   ]  Patient verifies taking blood pressure medications on a regular basis at the same time of the day.     Current Outpatient Medications:     amLODIPine (NORVASC) 5 MG tablet, 5 mg daily. Reduced dose, Disp: 30 tablet, Rfl: 5    clotrimazole-betamethasone 1-0.05% (LOTRISONE) cream, Apply topically 2 (two) times daily., Disp: 15 g, Rfl: 1    losartan-hydrochlorothiazide 100-25 mg (HYZAAR) 100-25 mg per tablet, Take 1 tablet by mouth once daily., Disp: 90 tablet, Rfl: 3    tadalafil (CIALIS) 5 MG tablet, Take 1 tablet (5 mg total) by mouth once daily., Disp: 30 tablet, Rfl: 11  Does patient have record of home blood pressure readings yes. Readings have been averaging 116/70,120/70.   Last dose of blood pressure medication was taken at 7:15AM,THIS MORNING.  Patient is asymptomatic.   Complains of none.    Vitals:    10/25/19 0910   BP: 114/84   BP Location: Left arm   Patient Position: Sitting   BP Method: Large (Manual)   Pulse: 91   SpO2: 97%         Dr. Demarcus Nieves notified.

## 2019-10-25 NOTE — TELEPHONE ENCOUNTER
Nurse visit for BP check, good, trial of stopping amlodipine and staying on losartan HCT.  Nurse visit 1 month

## 2019-10-28 ENCOUNTER — TELEPHONE (OUTPATIENT)
Dept: FAMILY MEDICINE | Facility: CLINIC | Age: 44
End: 2019-10-28

## 2019-10-28 NOTE — TELEPHONE ENCOUNTER
----- Message from Demarcus Nieves MD sent at 10/25/2019 12:13 PM CDT -----  Please call pt - stop the amlodipine; nurse visit 1 month

## 2019-11-22 ENCOUNTER — CLINICAL SUPPORT (OUTPATIENT)
Dept: FAMILY MEDICINE | Facility: CLINIC | Age: 44
End: 2019-11-22
Payer: COMMERCIAL

## 2019-11-22 VITALS — SYSTOLIC BLOOD PRESSURE: 122 MMHG | DIASTOLIC BLOOD PRESSURE: 74 MMHG | HEART RATE: 75 BPM | OXYGEN SATURATION: 98 %

## 2019-11-22 DIAGNOSIS — I10 ESSENTIAL HYPERTENSION: Primary | ICD-10-CM

## 2019-11-22 PROCEDURE — 99999 PR PBB SHADOW E&M-EST. PATIENT-LVL II: CPT | Mod: PBBFAC,,,

## 2019-11-22 PROCEDURE — 99499 NO LOS: ICD-10-PCS | Mod: S$GLB,,, | Performed by: INTERNAL MEDICINE

## 2019-11-22 PROCEDURE — 99999 PR PBB SHADOW E&M-EST. PATIENT-LVL II: ICD-10-PCS | Mod: PBBFAC,,,

## 2019-11-22 PROCEDURE — 99499 UNLISTED E&M SERVICE: CPT | Mod: S$GLB,,, | Performed by: INTERNAL MEDICINE

## 2019-11-22 NOTE — PROGRESS NOTES
Tevin Velazquez 44 y.o. male is here today for Blood Pressure check.   History of HTN yes.    Review of patient's allergies indicates:  No Known Allergies  Creatinine   Date Value Ref Range Status   09/25/2019 0.9 0.5 - 1.4 mg/dL Final     Sodium   Date Value Ref Range Status   09/25/2019 141 136 - 145 mmol/L Final     Potassium   Date Value Ref Range Status   09/25/2019 4.2 3.5 - 5.1 mmol/L Final   ]  Patient verifies taking blood pressure medications on a regular basis at the same time of the day.     Current Outpatient Medications:     clotrimazole-betamethasone 1-0.05% (LOTRISONE) cream, Apply topically 2 (two) times daily., Disp: 15 g, Rfl: 1    losartan-hydrochlorothiazide 100-25 mg (HYZAAR) 100-25 mg per tablet, Take 1 tablet by mouth once daily., Disp: 90 tablet, Rfl: 3    tadalafil (CIALIS) 5 MG tablet, Take 1 tablet (5 mg total) by mouth once daily., Disp: 30 tablet, Rfl: 11  Does patient have record of home blood pressure readings no. Readings have been averaging N/A.   Last dose of blood pressure medication was taken at 11/22/2019 @ 0700.  Patient is asymptomatic.   Complains of none.    Vitals:    11/22/19 0905   BP: 122/74   BP Location: Left arm   Patient Position: Sitting   BP Method: Large (Manual)   Pulse: 75   SpO2: 98%         Dr. Nieves informed of nurse visit.

## 2019-12-06 ENCOUNTER — PATIENT MESSAGE (OUTPATIENT)
Dept: FAMILY MEDICINE | Facility: CLINIC | Age: 44
End: 2019-12-06

## 2019-12-06 DIAGNOSIS — I10 ESSENTIAL HYPERTENSION: Primary | ICD-10-CM

## 2019-12-07 ENCOUNTER — PATIENT MESSAGE (OUTPATIENT)
Dept: FAMILY MEDICINE | Facility: CLINIC | Age: 44
End: 2019-12-07

## 2020-01-31 ENCOUNTER — CLINICAL SUPPORT (OUTPATIENT)
Dept: FAMILY MEDICINE | Facility: CLINIC | Age: 45
End: 2020-01-31
Payer: COMMERCIAL

## 2020-01-31 VITALS — SYSTOLIC BLOOD PRESSURE: 116 MMHG | HEART RATE: 77 BPM | DIASTOLIC BLOOD PRESSURE: 84 MMHG | OXYGEN SATURATION: 99 %

## 2020-01-31 DIAGNOSIS — I10 ESSENTIAL HYPERTENSION: Primary | ICD-10-CM

## 2020-01-31 PROCEDURE — 99499 UNLISTED E&M SERVICE: CPT | Mod: S$GLB,,, | Performed by: INTERNAL MEDICINE

## 2020-01-31 PROCEDURE — 99999 PR PBB SHADOW E&M-EST. PATIENT-LVL II: CPT | Mod: PBBFAC,,,

## 2020-01-31 PROCEDURE — 99499 NO LOS: ICD-10-PCS | Mod: S$GLB,,, | Performed by: INTERNAL MEDICINE

## 2020-01-31 PROCEDURE — 99999 PR PBB SHADOW E&M-EST. PATIENT-LVL II: ICD-10-PCS | Mod: PBBFAC,,,

## 2020-01-31 NOTE — PROGRESS NOTES
Tevin Velazquez 44 y.o. male is here today for Blood Pressure check.   History of HTN yes.    Review of patient's allergies indicates:  No Known Allergies  Creatinine   Date Value Ref Range Status   09/25/2019 0.9 0.5 - 1.4 mg/dL Final     Sodium   Date Value Ref Range Status   09/25/2019 141 136 - 145 mmol/L Final     Potassium   Date Value Ref Range Status   09/25/2019 4.2 3.5 - 5.1 mmol/L Final   ]  Patient denies taking blood pressure medications on a regular basis at the same time of the day.     Current Outpatient Medications:     clotrimazole-betamethasone 1-0.05% (LOTRISONE) cream, Apply topically 2 (two) times daily., Disp: 15 g, Rfl: 1    losartan-hydrochlorothiazide 100-25 mg (HYZAAR) 100-25 mg per tablet, Take 1 tablet by mouth once daily. (Patient not taking: Reported on 1/31/2020), Disp: 90 tablet, Rfl: 3    tadalafil (CIALIS) 5 MG tablet, Take 1 tablet (5 mg total) by mouth once daily. (Patient not taking: Reported on 1/31/2020), Disp: 30 tablet, Rfl: 11  Does patient have record of home blood pressure readings yes. Readings have been averaging 120/70s, 126/80s.   Last dose of blood pressure medication was taken at 11/22/2019.  Patient is symptomatic.   Complains of none,doing well.    Vitals:    01/31/20 1315   BP: 116/84   BP Location: Left arm   Patient Position: Sitting   BP Method: Medium (Manual)   Pulse: 77   SpO2: 99%         Dr. Demarcus Nieves notified.

## 2020-02-05 NOTE — PROGRESS NOTES
BP good, off of all BP meds, through diet/physical activity and weight loss.    Stay off of BP meds

## 2020-04-01 ENCOUNTER — PATIENT MESSAGE (OUTPATIENT)
Dept: FAMILY MEDICINE | Facility: CLINIC | Age: 45
End: 2020-04-01

## 2020-04-01 DIAGNOSIS — D64.9 NORMOCYTIC ANEMIA: Primary | ICD-10-CM

## 2020-04-01 NOTE — TELEPHONE ENCOUNTER
Outside labs, he is in a clinical trial for vitiligo, shows very mild normocytic anemia hemoglobin 13.7.  On our labs, roughly same level.  Has been going on for many years.  Would not do further aggressive testing at this time but with next labs check CBC, ferritin and iron profile.

## 2020-08-14 DIAGNOSIS — Z11.59 NEED FOR HEPATITIS C SCREENING TEST: ICD-10-CM

## 2020-09-04 ENCOUNTER — TELEPHONE (OUTPATIENT)
Dept: OPHTHALMOLOGY | Facility: CLINIC | Age: 45
End: 2020-09-04

## 2020-09-04 NOTE — TELEPHONE ENCOUNTER
----- Message from Tiera Duran sent at 9/4/2020 11:03 AM CDT -----  Contact: Tevin @ [phone]  Pt states he feels as if something is in his right eye. He states its been going on for about a week now. He states he needs to be seen today.

## 2020-09-10 ENCOUNTER — PATIENT OUTREACH (OUTPATIENT)
Dept: ADMINISTRATIVE | Facility: OTHER | Age: 45
End: 2020-09-10

## 2020-09-11 ENCOUNTER — OFFICE VISIT (OUTPATIENT)
Dept: OPTOMETRY | Facility: CLINIC | Age: 45
End: 2020-09-11
Payer: COMMERCIAL

## 2020-09-11 DIAGNOSIS — H11.151 PINGUECULA OF RIGHT EYE: Primary | ICD-10-CM

## 2020-09-11 DIAGNOSIS — H57.8A9 SENSATION OF FOREIGN BODY IN EYE: ICD-10-CM

## 2020-09-11 PROCEDURE — 99999 PR PBB SHADOW E&M-EST. PATIENT-LVL I: ICD-10-PCS | Mod: PBBFAC,,, | Performed by: OPTOMETRIST

## 2020-09-11 PROCEDURE — 92002 INTRM OPH EXAM NEW PATIENT: CPT | Mod: S$GLB,,, | Performed by: OPTOMETRIST

## 2020-09-11 PROCEDURE — 92002 PR EYE EXAM, NEW PATIENT,INTERMED: ICD-10-PCS | Mod: S$GLB,,, | Performed by: OPTOMETRIST

## 2020-09-11 PROCEDURE — 99999 PR PBB SHADOW E&M-EST. PATIENT-LVL I: CPT | Mod: PBBFAC,,, | Performed by: OPTOMETRIST

## 2020-09-11 NOTE — PROGRESS NOTES
Subjective:       Patient ID: Tevin Velazquez is a 44 y.o. male      Chief Complaint   Patient presents with    Eye Problem     History of Present Illness  HPI     Dls: never     43 y/o male presents today with c/o x 1 month fbs od no tearing no mucous   no changes in vision no pain. Pt did not use any gtts.      Last edited by Christie Reese on 9/11/2020  3:26 PM. (History)      Assessment/Plan:     1. Pinguecula of right eye  2. Sensation of foreign body in eye  Eyelid everted and swept. No FB. Discussed with patient. Recommend UV protection. AT PRN. Monitor.

## 2020-10-05 ENCOUNTER — PATIENT MESSAGE (OUTPATIENT)
Dept: ADMINISTRATIVE | Facility: HOSPITAL | Age: 45
End: 2020-10-05

## 2020-10-09 DIAGNOSIS — I10 ESSENTIAL HYPERTENSION: ICD-10-CM

## 2020-10-22 PROBLEM — D64.9 MILD CHRONIC ANEMIA: Status: ACTIVE | Noted: 2020-10-22

## 2020-10-22 NOTE — PROGRESS NOTES
This note was created by combination of typed  and M-Modal dictation.  Transcription errors may be present.  If there are any questions, please contact me.    Assessment and Plan:   Normal physical exam  Encounter for screening for HIV  Need for hepatitis C screening test  -check labs.  Doing well with his vision diet.  Takes a B12 supplement.  Check routine screening HIV and hep C.  -     Lipid Panel; Future; Expected date: 10/23/2020  -     Urinalysis; Future; Expected date: 10/23/2020  -     HIV 1/2 Ag/Ab (4th Gen); Future; Expected date: 10/23/2020    History of hypertension improved with diet/weight loss, off of meds 2019  -BP good.  Off of medications.  Stay off medicines.    Microhematuria ever since his 20s.  -check surveillance urinalysis.    Mild chronic anemia  -check iron studies.  No obvious sources of blood loss.  Mild leukopenia I think this is genetic, I would not do further testing.  Continue to routine monitor.    Needs flu shot  -     Influenza - Quadrivalent *Preferred* (6 months+) (PF)          Medications Discontinued During This Encounter   Medication Reason    losartan-hydrochlorothiazide 100-25 mg (HYZAAR) 100-25 mg per tablet Therapy completed       meds sent this encounter:       Follow Up: No follow-ups on file. PEx 1 year will need pre PEx labs.    Subjective:     Chief Complaint   Patient presents with    Annual Exam       HPI  Tevin is a 45 y.o. male, last appointment with this clinic was Visit date not found.    Last seen last year for physical  Hypertension, significant weight loss, was able to stop losartan HCTZ.  History of micro hematuria with a negative workup in the past including CT and cystoscopy.    History of mild anemia.    Labs are ordered including CBC, CMP, hep C screening, ferritin and iron profile.    BP he's been checking daily, normal. Notes 130/80s. BP high on intake.   Not taking any BP meds.   High at home 136 by recall  Home brachial cuff.      Trying to limit sodium intake.   Still vegan diet. For the past 1.6 years.   B12 taking supplement.     Vitiligo clinical study. Going well. Is going to finish up in January.  Thinks that the cream is helping - seems to have helped the face. The hands are a bit resistant.  They checked labs - noted low WBC.  Historically his WBC has been low.  I think this is due to ethnic background.  Reassurance.    Does note nervousness when public speaking. Or coming to doctor's offices.  But not interested in rx treatment.  i'll give him a list of some free apps for his phone.    Patient Care Team:  Demarcus Nieves MD as PCP - General (Internal Medicine)  Shaniqua Edwards MD as Consulting Physician (Urology)  Erick Back MD as Consulting Physician (Dermatology)  Bessy Sosa MA as Care Coordinator    Patient Active Problem List    Diagnosis Date Noted    Microhematuria ever since his 20s. 08/10/2017     11/24/2008 CT urogram likely kidney cysts.    Reports cystoscopy 2008 was unremarkable.  I don't see the report.      History of hypertension improved with diet/weight loss, off of meds 2019 03/17/2013    Family history of diabetes mellitus 03/17/2013    Metatarsalgia 03/17/2013    Vitiligo 03/17/2013     Derm Dr. 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   Social Needs    Financial resource strain: Not on file    Food insecurity     Worry: Not on file     Inability: Not on file    Transportation needs     Medical: Not on file      Non-medical: Not on file   Tobacco Use    Smoking status: Never Smoker    Smokeless tobacco: Never Used   Substance and Sexual Activity    Alcohol use: Yes     Alcohol/week: 0.0 standard drinks     Comment: social    Drug use: No    Sexual activity: Yes     Partners: Female   Lifestyle    Physical activity     Days per week: Not on file     Minutes per session: Not on file    Stress: Not on file   Relationships    Social connections     Talks on phone: Not on file     Gets together: Not on file     Attends Episcopalian service: Not on file     Active member of club or organization: Not on file     Attends meetings of clubs or organizations: Not on file     Relationship status: Not on file   Other Topics Concern    Not on file   Social History Narrative    [employer] graduate, he had played on the basketball team, and has worked offshore in safety and other capacities.         ALLERGIES AND MEDICATIONS: updated and reviewed.  Review of patient's allergies indicates:  No Known Allergies    Medication List with Changes/Refills   Current Medications    CLOTRIMAZOLE-BETAMETHASONE 1-0.05% (LOTRISONE) CREAM    Apply topically 2 (two) times daily.    LOSARTAN-HYDROCHLOROTHIAZIDE 100-25 MG (HYZAAR) 100-25 MG PER TABLET    TAKE 1 TABLET BY MOUTH EVERY DAY        Review of Systems   Constitutional: Negative for fever, malaise/fatigue and weight loss.   HENT: Negative for congestion.    Eyes: Negative for blurred vision and pain.   Respiratory: Negative for shortness of breath and wheezing.    Cardiovascular: Negative for chest pain, palpitations and leg swelling.   Gastrointestinal: Negative for abdominal pain, blood in stool, constipation, diarrhea and heartburn.   Genitourinary: Negative for dysuria, hematuria and urgency.   Neurological: Negative for tingling, focal weakness, weakness and headaches.       Objective:   Physical Exam   Vitals:    10/23/20 1041 10/23/20 1111   BP: (!) 146/88 130/70   BP Location:  "Left arm Left arm   Patient Position: Sitting Sitting   BP Method: Large (Manual) Large (Manual)   Pulse: 80    Temp: 97.7 °F (36.5 °C)    TempSrc: Temporal    SpO2: 98%    Weight: 119.7 kg (264 lb)    Height: 6' 6" (1.981 m)     Body mass index is 30.51 kg/m².            Physical Exam  Constitutional:       Appearance: Normal appearance. He is well-developed.   HENT:      Right Ear: Tympanic membrane and external ear normal.      Left Ear: Tympanic membrane and external ear normal.      Nose: Nose normal.   Eyes:      General: No scleral icterus.     Conjunctiva/sclera: Conjunctivae normal.   Neck:      Thyroid: No thyroid mass or thyromegaly.      Trachea: Trachea normal.   Cardiovascular:      Rate and Rhythm: Normal rate and regular rhythm.      Heart sounds: Normal heart sounds, S1 normal and S2 normal. No murmur.   Pulmonary:      Effort: Pulmonary effort is normal.      Breath sounds: Normal breath sounds.   Abdominal:      General: There is no distension.      Palpations: Abdomen is soft. There is no hepatomegaly, splenomegaly or mass.      Tenderness: There is no abdominal tenderness.   Musculoskeletal:         General: No deformity.   Lymphadenopathy:      Cervical: No cervical adenopathy.   Skin:     General: Skin is warm and dry.      Findings: No rash (on exposed skin).      Comments: On exposed skin  Vitiligo on the extensor hands bilaterally   Neurological:      Mental Status: He is alert and oriented to person, place, and time.      Cranial Nerves: No cranial nerve deficit.      Sensory: No sensory deficit.      Deep Tendon Reflexes: Reflexes are normal and symmetric.   Psychiatric:         Speech: Speech normal.         Behavior: Behavior normal.         Thought Content: Thought content normal.         Judgment: Judgment normal.         "

## 2020-10-23 ENCOUNTER — LAB VISIT (OUTPATIENT)
Dept: LAB | Facility: HOSPITAL | Age: 45
End: 2020-10-23
Attending: INTERNAL MEDICINE
Payer: COMMERCIAL

## 2020-10-23 ENCOUNTER — OFFICE VISIT (OUTPATIENT)
Dept: FAMILY MEDICINE | Facility: CLINIC | Age: 45
End: 2020-10-23
Payer: COMMERCIAL

## 2020-10-23 VITALS
TEMPERATURE: 98 F | SYSTOLIC BLOOD PRESSURE: 130 MMHG | BODY MASS INDEX: 30.55 KG/M2 | WEIGHT: 264 LBS | HEIGHT: 78 IN | HEART RATE: 80 BPM | DIASTOLIC BLOOD PRESSURE: 70 MMHG | OXYGEN SATURATION: 98 %

## 2020-10-23 DIAGNOSIS — Z00.00 NORMAL PHYSICAL EXAM: Primary | ICD-10-CM

## 2020-10-23 DIAGNOSIS — Z86.79 HISTORY OF HYPERTENSION: ICD-10-CM

## 2020-10-23 DIAGNOSIS — D64.9 MILD CHRONIC ANEMIA: ICD-10-CM

## 2020-10-23 DIAGNOSIS — Z23 NEEDS FLU SHOT: ICD-10-CM

## 2020-10-23 DIAGNOSIS — Z11.59 NEED FOR HEPATITIS C SCREENING TEST: ICD-10-CM

## 2020-10-23 DIAGNOSIS — R31.29 MICROHEMATURIA: ICD-10-CM

## 2020-10-23 DIAGNOSIS — D64.9 NORMOCYTIC ANEMIA: ICD-10-CM

## 2020-10-23 DIAGNOSIS — Z11.4 ENCOUNTER FOR SCREENING FOR HIV: ICD-10-CM

## 2020-10-23 DIAGNOSIS — Z00.00 NORMAL PHYSICAL EXAM: ICD-10-CM

## 2020-10-23 DIAGNOSIS — I10 ESSENTIAL HYPERTENSION: ICD-10-CM

## 2020-10-23 LAB
ALBUMIN SERPL BCP-MCNC: 4.2 G/DL (ref 3.5–5.2)
ALP SERPL-CCNC: 60 U/L (ref 55–135)
ALT SERPL W/O P-5'-P-CCNC: 21 U/L (ref 10–44)
ANION GAP SERPL CALC-SCNC: 8 MMOL/L (ref 8–16)
AST SERPL-CCNC: 21 U/L (ref 10–40)
BASOPHILS # BLD AUTO: 0.02 K/UL (ref 0–0.2)
BASOPHILS NFR BLD: 0.7 % (ref 0–1.9)
BILIRUB SERPL-MCNC: 1.3 MG/DL (ref 0.1–1)
BUN SERPL-MCNC: 7 MG/DL (ref 6–20)
CALCIUM SERPL-MCNC: 9 MG/DL (ref 8.7–10.5)
CHLORIDE SERPL-SCNC: 107 MMOL/L (ref 95–110)
CHOLEST SERPL-MCNC: 171 MG/DL (ref 120–199)
CHOLEST/HDLC SERPL: 3.1 {RATIO} (ref 2–5)
CO2 SERPL-SCNC: 27 MMOL/L (ref 23–29)
CREAT SERPL-MCNC: 0.9 MG/DL (ref 0.5–1.4)
DIFFERENTIAL METHOD: ABNORMAL
EOSINOPHIL # BLD AUTO: 0.1 K/UL (ref 0–0.5)
EOSINOPHIL NFR BLD: 3.9 % (ref 0–8)
ERYTHROCYTE [DISTWIDTH] IN BLOOD BY AUTOMATED COUNT: 14.2 % (ref 11.5–14.5)
EST. GFR  (AFRICAN AMERICAN): >60 ML/MIN/1.73 M^2
EST. GFR  (NON AFRICAN AMERICAN): >60 ML/MIN/1.73 M^2
FERRITIN SERPL-MCNC: 325 NG/ML (ref 20–300)
GLUCOSE SERPL-MCNC: 87 MG/DL (ref 70–110)
HCT VFR BLD AUTO: 45.3 % (ref 40–54)
HDLC SERPL-MCNC: 56 MG/DL (ref 40–75)
HDLC SERPL: 32.7 % (ref 20–50)
HGB BLD-MCNC: 13.8 G/DL (ref 14–18)
IMM GRANULOCYTES # BLD AUTO: 0.01 K/UL (ref 0–0.04)
IMM GRANULOCYTES NFR BLD AUTO: 0.4 % (ref 0–0.5)
IRON SERPL-MCNC: 112 UG/DL (ref 45–160)
LDLC SERPL CALC-MCNC: 105.8 MG/DL (ref 63–159)
LYMPHOCYTES # BLD AUTO: 1.2 K/UL (ref 1–4.8)
LYMPHOCYTES NFR BLD: 41.8 % (ref 18–48)
MCH RBC QN AUTO: 28.5 PG (ref 27–31)
MCHC RBC AUTO-ENTMCNC: 30.5 G/DL (ref 32–36)
MCV RBC AUTO: 93 FL (ref 82–98)
MONOCYTES # BLD AUTO: 0.2 K/UL (ref 0.3–1)
MONOCYTES NFR BLD: 8.4 % (ref 4–15)
NEUTROPHILS # BLD AUTO: 1.3 K/UL (ref 1.8–7.7)
NEUTROPHILS NFR BLD: 44.8 % (ref 38–73)
NONHDLC SERPL-MCNC: 115 MG/DL
NRBC BLD-RTO: 0 /100 WBC
PLATELET # BLD AUTO: 215 K/UL (ref 150–350)
PMV BLD AUTO: 10.7 FL (ref 9.2–12.9)
POTASSIUM SERPL-SCNC: 4.3 MMOL/L (ref 3.5–5.1)
PROT SERPL-MCNC: 7 G/DL (ref 6–8.4)
RBC # BLD AUTO: 4.85 M/UL (ref 4.6–6.2)
SATURATED IRON: 36 % (ref 20–50)
SODIUM SERPL-SCNC: 142 MMOL/L (ref 136–145)
TOTAL IRON BINDING CAPACITY: 314 UG/DL (ref 250–450)
TRANSFERRIN SERPL-MCNC: 212 MG/DL (ref 200–375)
TRIGL SERPL-MCNC: 46 MG/DL (ref 30–150)
WBC # BLD AUTO: 2.85 K/UL (ref 3.9–12.7)

## 2020-10-23 PROCEDURE — 36415 COLL VENOUS BLD VENIPUNCTURE: CPT | Mod: PO

## 2020-10-23 PROCEDURE — 80061 LIPID PANEL: CPT

## 2020-10-23 PROCEDURE — 90471 IMMUNIZATION ADMIN: CPT | Mod: S$GLB,,, | Performed by: INTERNAL MEDICINE

## 2020-10-23 PROCEDURE — 90686 IIV4 VACC NO PRSV 0.5 ML IM: CPT | Mod: S$GLB,,, | Performed by: INTERNAL MEDICINE

## 2020-10-23 PROCEDURE — 99396 PREV VISIT EST AGE 40-64: CPT | Mod: 25,S$GLB,, | Performed by: INTERNAL MEDICINE

## 2020-10-23 PROCEDURE — 99999 PR PBB SHADOW E&M-EST. PATIENT-LVL IV: CPT | Mod: PBBFAC,,, | Performed by: INTERNAL MEDICINE

## 2020-10-23 PROCEDURE — 82728 ASSAY OF FERRITIN: CPT

## 2020-10-23 PROCEDURE — 90686 FLU VACCINE (QUAD) GREATER THAN OR EQUAL TO 3YO PRESERVATIVE FREE IM: ICD-10-PCS | Mod: S$GLB,,, | Performed by: INTERNAL MEDICINE

## 2020-10-23 PROCEDURE — 90471 FLU VACCINE (QUAD) GREATER THAN OR EQUAL TO 3YO PRESERVATIVE FREE IM: ICD-10-PCS | Mod: S$GLB,,, | Performed by: INTERNAL MEDICINE

## 2020-10-23 PROCEDURE — 99999 PR PBB SHADOW E&M-EST. PATIENT-LVL IV: ICD-10-PCS | Mod: PBBFAC,,, | Performed by: INTERNAL MEDICINE

## 2020-10-23 PROCEDURE — 99396 PR PREVENTIVE VISIT,EST,40-64: ICD-10-PCS | Mod: 25,S$GLB,, | Performed by: INTERNAL MEDICINE

## 2020-10-23 PROCEDURE — 86703 HIV-1/HIV-2 1 RESULT ANTBDY: CPT

## 2020-10-23 PROCEDURE — 80053 COMPREHEN METABOLIC PANEL: CPT

## 2020-10-23 PROCEDURE — 83540 ASSAY OF IRON: CPT

## 2020-10-23 PROCEDURE — 85025 COMPLETE CBC W/AUTO DIFF WBC: CPT

## 2020-10-23 PROCEDURE — 86803 HEPATITIS C AB TEST: CPT

## 2020-10-23 NOTE — PATIENT INSTRUCTIONS
Below is a list of free Apps that you may find helpful (some of them may not apply to you since this is a general list of helpful Apps):    Android & Apple users:    Headspace - Guides your through meditation.  The first 10 programs are free.  Mindshift - Information that is helpful for anxiety. Geared towards young adults but may be helpful for all ages  Stop, Breathe and Think - Has suggestions for meditation and mindfulness exercises depending on your daily mood

## 2020-10-26 LAB
HCV AB SERPL QL IA: NEGATIVE
HIV 1+2 AB+HIV1 P24 AG SERPL QL IA: NEGATIVE

## 2020-10-26 NOTE — PROGRESS NOTES
CBC mild anemia chronic. Stable  Ferritin high. Iron panel normal.  Seems more like AoCD  Lipid normal  CMP WNL    HIV, HCV pending  Results to pt via [employer]. If AoCD, unclear source of inflammation. Normal exam. monitor

## 2020-10-27 NOTE — PROGRESS NOTES
CBC mild anemia chronic. Stable  Ferritin high. Iron panel normal.  Seems more like AoCD  Lipid normal  CMP WNL  HIV, HCV negative    Results to pt via [employer]. If AoCD, unclear source of inflammation. Normal exam. monitor

## 2021-03-29 ENCOUNTER — IMMUNIZATION (OUTPATIENT)
Dept: PHARMACY | Facility: CLINIC | Age: 46
End: 2021-03-29
Payer: COMMERCIAL

## 2021-03-29 DIAGNOSIS — Z23 NEED FOR VACCINATION: Primary | ICD-10-CM

## 2021-04-26 ENCOUNTER — IMMUNIZATION (OUTPATIENT)
Dept: PHARMACY | Facility: CLINIC | Age: 46
End: 2021-04-26
Payer: COMMERCIAL

## 2021-04-26 DIAGNOSIS — Z23 NEED FOR VACCINATION: Primary | ICD-10-CM

## 2021-06-25 ENCOUNTER — APPOINTMENT (RX ONLY)
Dept: URBAN - METROPOLITAN AREA CLINIC 321 | Facility: CLINIC | Age: 46
Setting detail: DERMATOLOGY
End: 2021-06-25

## 2021-06-25 DIAGNOSIS — L57.8 OTHER SKIN CHANGES DUE TO CHRONIC EXPOSURE TO NONIONIZING RADIATION: ICD-10-CM | Status: STABLE

## 2021-06-25 DIAGNOSIS — D22 MELANOCYTIC NEVI: ICD-10-CM | Status: STABLE

## 2021-06-25 DIAGNOSIS — D18.0 HEMANGIOMA: ICD-10-CM | Status: STABLE

## 2021-06-25 DIAGNOSIS — L82.1 OTHER SEBORRHEIC KERATOSIS: ICD-10-CM | Status: STABLE

## 2021-06-25 DIAGNOSIS — L81.4 OTHER MELANIN HYPERPIGMENTATION: ICD-10-CM | Status: STABLE

## 2021-06-25 DIAGNOSIS — Z02.9 ENCOUNTER FOR ADMINISTRATIVE EXAMINATIONS, UNSPECIFIED: ICD-10-CM

## 2021-06-25 DIAGNOSIS — L55.9 SUNBURN, UNSPECIFIED: ICD-10-CM | Status: STABLE

## 2021-06-25 PROBLEM — D22.9 MELANOCYTIC NEVI, UNSPECIFIED: Status: ACTIVE | Noted: 2021-06-25

## 2021-06-25 PROBLEM — D18.01 HEMANGIOMA OF SKIN AND SUBCUTANEOUS TISSUE: Status: ACTIVE | Noted: 2021-06-25

## 2021-06-25 PROBLEM — D22.5 MELANOCYTIC NEVI OF TRUNK: Status: ACTIVE | Noted: 2021-06-25

## 2021-06-25 PROCEDURE — ? TREATMENT REGIMEN

## 2021-06-25 PROCEDURE — ? SUNSCREEN RECOMMENDATIONS

## 2021-06-25 PROCEDURE — 99203 OFFICE O/P NEW LOW 30 MIN: CPT

## 2021-06-25 PROCEDURE — ? COUNSELING

## 2021-06-25 PROCEDURE — ? FULL BODY SKIN EXAM

## 2021-06-25 ASSESSMENT — LOCATION SIMPLE DESCRIPTION DERM
LOCATION SIMPLE: UPPER BACK
LOCATION SIMPLE: CHEST
LOCATION SIMPLE: ABDOMEN
LOCATION SIMPLE: RIGHT BREAST
LOCATION SIMPLE: LEFT ANTERIOR NECK
LOCATION SIMPLE: RIGHT PRETIBIAL REGION
LOCATION SIMPLE: LEFT PRETIBIAL REGION
LOCATION SIMPLE: LEFT FOREARM

## 2021-06-25 ASSESSMENT — LOCATION DETAILED DESCRIPTION DERM
LOCATION DETAILED: LEFT DISTAL PRETIBIAL REGION
LOCATION DETAILED: LEFT SUPERIOR ANTERIOR NECK
LOCATION DETAILED: LEFT VENTRAL DISTAL FOREARM
LOCATION DETAILED: SUPERIOR THORACIC SPINE
LOCATION DETAILED: EPIGASTRIC SKIN
LOCATION DETAILED: STERNAL NOTCH
LOCATION DETAILED: RIGHT MEDIAL BREAST 2-3:00 REGION
LOCATION DETAILED: RIGHT DISTAL PRETIBIAL REGION
LOCATION DETAILED: LEFT VENTRAL PROXIMAL FOREARM

## 2021-06-25 ASSESSMENT — LOCATION ZONE DERM
LOCATION ZONE: LEG
LOCATION ZONE: ARM
LOCATION ZONE: NECK
LOCATION ZONE: TRUNK

## 2021-06-25 NOTE — PROCEDURE: COUNSELING
Detail Level: Generalized
Detail Level: Zone
Sunscreen Recommendations: \\n**Lesion was partially biopsied in the past, discussed that as lesion is raised and easily traumatized by grooming he may be candidate for excisional biopsy with Dr Clay to remove.
Detail Level: Simple

## 2021-11-05 ENCOUNTER — PATIENT MESSAGE (OUTPATIENT)
Dept: FAMILY MEDICINE | Facility: CLINIC | Age: 46
End: 2021-11-05
Payer: COMMERCIAL

## 2021-11-19 ENCOUNTER — PATIENT MESSAGE (OUTPATIENT)
Dept: FAMILY MEDICINE | Facility: CLINIC | Age: 46
End: 2021-11-19
Payer: COMMERCIAL

## 2021-11-19 DIAGNOSIS — N52.9 MALE ERECTILE DISORDER: Primary | ICD-10-CM

## 2021-11-19 DIAGNOSIS — Z00.00 NORMAL PHYSICAL EXAM: ICD-10-CM

## 2021-11-19 RX ORDER — TADALAFIL 5 MG/1
5 TABLET ORAL DAILY PRN
Qty: 30 TABLET | Refills: 0 | Status: SHIPPED | OUTPATIENT
Start: 2021-11-19 | End: 2021-12-14

## 2022-01-11 DIAGNOSIS — N52.9 MALE ERECTILE DISORDER: ICD-10-CM

## 2022-01-11 RX ORDER — TADALAFIL 5 MG/1
TABLET ORAL
Qty: 30 TABLET | Refills: 0 | Status: SHIPPED | OUTPATIENT
Start: 2022-01-11 | End: 2022-02-13

## 2022-03-14 DIAGNOSIS — N52.9 MALE ERECTILE DISORDER: ICD-10-CM

## 2022-03-14 NOTE — TELEPHONE ENCOUNTER
No new care gaps identified.  Powered by [employer] by [employer]. Reference number: [id number].   3/14/2022 12:39:26 PM CDT

## 2022-03-15 RX ORDER — TADALAFIL 5 MG/1
TABLET ORAL
Qty: 30 TABLET | Refills: 0 | Status: SHIPPED | OUTPATIENT
Start: 2022-03-15 | End: 2022-05-06 | Stop reason: SDUPTHER

## 2022-03-15 NOTE — TELEPHONE ENCOUNTER

## 2022-04-19 DIAGNOSIS — N52.9 MALE ERECTILE DISORDER: ICD-10-CM

## 2022-04-19 RX ORDER — TADALAFIL 5 MG/1
TABLET ORAL
Qty: 30 TABLET | Refills: 0 | OUTPATIENT
Start: 2022-04-19

## 2022-04-19 NOTE — TELEPHONE ENCOUNTER
Care Due:                  Date            Visit Type   Department     Provider  --------------------------------------------------------------------------------                                EP [facility]                              PRIMARY      MED/ INTERNAL  Last Visit: 10-      CARE ([facility])   MED/ PEDS      Demarcus Nieves  Next Visit: None Scheduled  None         None Found                                                            Last  Test          Frequency    Reason                     Performed    Due Date  --------------------------------------------------------------------------------    Office Visit  12 months..  tadalafiL................  10-   10-    Powered by [employer] by [employer]. Reference number: [id number].   4/19/2022 1:32:24 PM CDT

## 2022-04-19 NOTE — TELEPHONE ENCOUNTER
Refill Routing Note   Medication(s) are not appropriate for processing by [facility] for the following reason(s):      - Patient has not been seen in over 15 months by PCP    [facility] action(s):  Defer Medication-related problems identified: Requires appointment        Medication reconciliation completed: No     Appointments  past 12m or future 3m with PCP    Date Provider   Last Visit   10/23/2020 Demarcus Nieves MD   Next Visit   Visit date not found Demarcus Nieves MD   ED visits in past 90 days: 0        Note composed:4:25 PM 04/19/2022

## 2022-04-30 ENCOUNTER — PATIENT MESSAGE (OUTPATIENT)
Dept: FAMILY MEDICINE | Facility: CLINIC | Age: 47
End: 2022-04-30
Payer: COMMERCIAL

## 2022-04-30 ENCOUNTER — LAB VISIT (OUTPATIENT)
Dept: LAB | Facility: HOSPITAL | Age: 47
End: 2022-04-30
Attending: INTERNAL MEDICINE
Payer: COMMERCIAL

## 2022-04-30 DIAGNOSIS — Z00.00 NORMAL PHYSICAL EXAM: ICD-10-CM

## 2022-04-30 LAB
ALBUMIN SERPL BCP-MCNC: 3.9 G/DL (ref 3.5–5.2)
ALP SERPL-CCNC: 65 U/L (ref 55–135)
ALT SERPL W/O P-5'-P-CCNC: 13 U/L (ref 10–44)
ANION GAP SERPL CALC-SCNC: 10 MMOL/L (ref 8–16)
AST SERPL-CCNC: 21 U/L (ref 10–40)
BASOPHILS # BLD AUTO: 0.03 K/UL (ref 0–0.2)
BASOPHILS NFR BLD: 0.9 % (ref 0–1.9)
BILIRUB SERPL-MCNC: 1.2 MG/DL (ref 0.1–1)
BUN SERPL-MCNC: 8 MG/DL (ref 6–20)
CALCIUM SERPL-MCNC: 9.4 MG/DL (ref 8.7–10.5)
CHLORIDE SERPL-SCNC: 104 MMOL/L (ref 95–110)
CHOLEST SERPL-MCNC: 207 MG/DL (ref 120–199)
CHOLEST/HDLC SERPL: 4.3 {RATIO} (ref 2–5)
CO2 SERPL-SCNC: 27 MMOL/L (ref 23–29)
CREAT SERPL-MCNC: 0.9 MG/DL (ref 0.5–1.4)
DIFFERENTIAL METHOD: ABNORMAL
EOSINOPHIL # BLD AUTO: 0.3 K/UL (ref 0–0.5)
EOSINOPHIL NFR BLD: 7.6 % (ref 0–8)
ERYTHROCYTE [DISTWIDTH] IN BLOOD BY AUTOMATED COUNT: 14 % (ref 11.5–14.5)
EST. GFR  (AFRICAN AMERICAN): >60 ML/MIN/1.73 M^2
EST. GFR  (NON AFRICAN AMERICAN): >60 ML/MIN/1.73 M^2
ESTIMATED AVG GLUCOSE: 111 MG/DL (ref 68–131)
GLUCOSE SERPL-MCNC: 93 MG/DL (ref 70–110)
HBA1C MFR BLD: 5.5 % (ref 4–5.6)
HCT VFR BLD AUTO: 48.5 % (ref 40–54)
HDLC SERPL-MCNC: 48 MG/DL (ref 40–75)
HDLC SERPL: 23.2 % (ref 20–50)
HGB BLD-MCNC: 14.6 G/DL (ref 14–18)
IMM GRANULOCYTES # BLD AUTO: 0.01 K/UL (ref 0–0.04)
IMM GRANULOCYTES NFR BLD AUTO: 0.3 % (ref 0–0.5)
LDLC SERPL CALC-MCNC: 145.2 MG/DL (ref 63–159)
LYMPHOCYTES # BLD AUTO: 1.2 K/UL (ref 1–4.8)
LYMPHOCYTES NFR BLD: 36.7 % (ref 18–48)
MCH RBC QN AUTO: 27.9 PG (ref 27–31)
MCHC RBC AUTO-ENTMCNC: 30.1 G/DL (ref 32–36)
MCV RBC AUTO: 93 FL (ref 82–98)
MONOCYTES # BLD AUTO: 0.3 K/UL (ref 0.3–1)
MONOCYTES NFR BLD: 8.3 % (ref 4–15)
NEUTROPHILS # BLD AUTO: 1.5 K/UL (ref 1.8–7.7)
NEUTROPHILS NFR BLD: 46.2 % (ref 38–73)
NONHDLC SERPL-MCNC: 159 MG/DL
NRBC BLD-RTO: 0 /100 WBC
PLATELET # BLD AUTO: 210 K/UL (ref 150–450)
PMV BLD AUTO: 10.6 FL (ref 9.2–12.9)
POTASSIUM SERPL-SCNC: 4.1 MMOL/L (ref 3.5–5.1)
PROT SERPL-MCNC: 7.2 G/DL (ref 6–8.4)
RBC # BLD AUTO: 5.24 M/UL (ref 4.6–6.2)
SODIUM SERPL-SCNC: 141 MMOL/L (ref 136–145)
TRIGL SERPL-MCNC: 69 MG/DL (ref 30–150)
TSH SERPL DL<=0.005 MIU/L-ACNC: 1.11 UIU/ML (ref 0.4–4)
WBC # BLD AUTO: 3.27 K/UL (ref 3.9–12.7)

## 2022-04-30 PROCEDURE — 83036 HEMOGLOBIN GLYCOSYLATED A1C: CPT | Performed by: INTERNAL MEDICINE

## 2022-04-30 PROCEDURE — 80053 COMPREHEN METABOLIC PANEL: CPT | Performed by: INTERNAL MEDICINE

## 2022-04-30 PROCEDURE — 80061 LIPID PANEL: CPT | Performed by: INTERNAL MEDICINE

## 2022-04-30 PROCEDURE — 36415 COLL VENOUS BLD VENIPUNCTURE: CPT | Mod: PO | Performed by: INTERNAL MEDICINE

## 2022-04-30 PROCEDURE — 84443 ASSAY THYROID STIM HORMONE: CPT | Performed by: INTERNAL MEDICINE

## 2022-04-30 PROCEDURE — 85025 COMPLETE CBC W/AUTO DIFF WBC: CPT | Performed by: INTERNAL MEDICINE
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
## 2022-05-06 ENCOUNTER — LAB VISIT (OUTPATIENT)
Dept: LAB | Facility: HOSPITAL | Age: 47
End: 2022-05-06
Attending: INTERNAL MEDICINE
Payer: COMMERCIAL

## 2022-05-06 ENCOUNTER — OFFICE VISIT (OUTPATIENT)
Dept: FAMILY MEDICINE | Facility: CLINIC | Age: 47
End: 2022-05-06
Payer: COMMERCIAL

## 2022-05-06 VITALS
HEIGHT: 78 IN | TEMPERATURE: 98 F | OXYGEN SATURATION: 97 % | BODY MASS INDEX: 35.1 KG/M2 | SYSTOLIC BLOOD PRESSURE: 124 MMHG | HEART RATE: 97 BPM | WEIGHT: 303.38 LBS | DIASTOLIC BLOOD PRESSURE: 80 MMHG

## 2022-05-06 DIAGNOSIS — N52.9 MALE ERECTILE DISORDER: ICD-10-CM

## 2022-05-06 DIAGNOSIS — R31.29 MICROHEMATURIA: ICD-10-CM

## 2022-05-06 DIAGNOSIS — Z86.79 HISTORY OF HYPERTENSION: ICD-10-CM

## 2022-05-06 DIAGNOSIS — Z00.00 NORMAL PHYSICAL EXAM: Primary | ICD-10-CM

## 2022-05-06 DIAGNOSIS — Z00.00 NORMAL PHYSICAL EXAM: ICD-10-CM

## 2022-05-06 LAB
BILIRUB UR QL STRIP: NEGATIVE
CLARITY UR REFRACT.AUTO: CLEAR
COLOR UR AUTO: COLORLESS
GLUCOSE UR QL STRIP: NEGATIVE
HGB UR QL STRIP: ABNORMAL
KETONES UR QL STRIP: NEGATIVE
LEUKOCYTE ESTERASE UR QL STRIP: NEGATIVE
MICROSCOPIC COMMENT: NORMAL
NITRITE UR QL STRIP: NEGATIVE
PH UR STRIP: 7 [PH] (ref 5–8)
PROT UR QL STRIP: NEGATIVE
RBC #/AREA URNS AUTO: 0 /HPF (ref 0–4)
SP GR UR STRIP: 1 (ref 1–1.03)
URN SPEC COLLECT METH UR: ABNORMAL

## 2022-05-06 PROCEDURE — 81001 URINALYSIS AUTO W/SCOPE: CPT | Performed by: INTERNAL MEDICINE

## 2022-05-06 PROCEDURE — 99999 PR PBB SHADOW E&M-EST. PATIENT-LVL IV: ICD-10-PCS | Mod: PBBFAC,,, | Performed by: INTERNAL MEDICINE

## 2022-05-06 PROCEDURE — 99999 PR PBB SHADOW E&M-EST. PATIENT-LVL IV: CPT | Mod: PBBFAC,,, | Performed by: INTERNAL MEDICINE

## 2022-05-06 PROCEDURE — 99396 PR PREVENTIVE VISIT,EST,40-64: ICD-10-PCS | Mod: S$GLB,,, | Performed by: INTERNAL MEDICINE

## 2022-05-06 PROCEDURE — 99396 PREV VISIT EST AGE 40-64: CPT | Mod: S$GLB,,, | Performed by: INTERNAL MEDICINE

## 2022-05-06 RX ORDER — TADALAFIL 5 MG/1
TABLET ORAL
Qty: 30 TABLET | Refills: 5 | Status: SHIPPED | OUTPATIENT
Start: 2022-05-06 | End: 2023-05-08 | Stop reason: SDUPTHER

## 2022-05-06 RX ORDER — CLOBETASOL PROPIONATE 0.5 MG/G
CREAM TOPICAL
COMMUNITY
Start: 2022-02-07 | End: 2022-05-06

## 2022-05-06 NOTE — PROGRESS NOTES
Urinalysis initial occult blood 1+ but on microscopy RBC 0.   Results to patient via my chart.  No further testing.

## 2023-05-02 ENCOUNTER — TELEPHONE (OUTPATIENT)
Dept: FAMILY MEDICINE | Facility: CLINIC | Age: 48
End: 2023-05-02
Payer: COMMERCIAL

## 2023-05-02 ENCOUNTER — LAB VISIT (OUTPATIENT)
Dept: LAB | Facility: HOSPITAL | Age: 48
End: 2023-05-02
Attending: INTERNAL MEDICINE
Payer: COMMERCIAL

## 2023-05-02 DIAGNOSIS — Z00.00 NORMAL PHYSICAL EXAM: ICD-10-CM

## 2023-05-02 DIAGNOSIS — Z00.00 ANNUAL PHYSICAL EXAM: Primary | ICD-10-CM

## 2023-05-02 LAB
ALBUMIN SERPL BCP-MCNC: 3.8 G/DL (ref 3.5–5.2)
ALP SERPL-CCNC: 62 U/L (ref 55–135)
ALT SERPL W/O P-5'-P-CCNC: 15 U/L (ref 10–44)
ANION GAP SERPL CALC-SCNC: 6 MMOL/L (ref 8–16)
AST SERPL-CCNC: 15 U/L (ref 10–40)
BASOPHILS # BLD AUTO: 0.02 K/UL (ref 0–0.2)
BASOPHILS NFR BLD: 0.6 % (ref 0–1.9)
BILIRUB SERPL-MCNC: 0.7 MG/DL (ref 0.1–1)
BUN SERPL-MCNC: 7 MG/DL (ref 6–20)
CALCIUM SERPL-MCNC: 9.3 MG/DL (ref 8.7–10.5)
CHLORIDE SERPL-SCNC: 108 MMOL/L (ref 95–110)
CHOLEST SERPL-MCNC: 180 MG/DL (ref 120–199)
CHOLEST/HDLC SERPL: 4.4 {RATIO} (ref 2–5)
CO2 SERPL-SCNC: 28 MMOL/L (ref 23–29)
CREAT SERPL-MCNC: 1 MG/DL (ref 0.5–1.4)
DIFFERENTIAL METHOD: ABNORMAL
EOSINOPHIL # BLD AUTO: 0.2 K/UL (ref 0–0.5)
EOSINOPHIL NFR BLD: 5.4 % (ref 0–8)
ERYTHROCYTE [DISTWIDTH] IN BLOOD BY AUTOMATED COUNT: 13.8 % (ref 11.5–14.5)
EST. GFR  (NO RACE VARIABLE): >60 ML/MIN/1.73 M^2
GLUCOSE SERPL-MCNC: 92 MG/DL (ref 70–110)
HCT VFR BLD AUTO: 45.2 % (ref 40–54)
HDLC SERPL-MCNC: 41 MG/DL (ref 40–75)
HDLC SERPL: 22.8 % (ref 20–50)
HGB BLD-MCNC: 14 G/DL (ref 14–18)
IMM GRANULOCYTES # BLD AUTO: 0 K/UL (ref 0–0.04)
IMM GRANULOCYTES NFR BLD AUTO: 0 % (ref 0–0.5)
LDLC SERPL CALC-MCNC: 129.4 MG/DL (ref 63–159)
LYMPHOCYTES # BLD AUTO: 1.1 K/UL (ref 1–4.8)
LYMPHOCYTES NFR BLD: 30.4 % (ref 18–48)
MCH RBC QN AUTO: 27.6 PG (ref 27–31)
MCHC RBC AUTO-ENTMCNC: 31 G/DL (ref 32–36)
MCV RBC AUTO: 89 FL (ref 82–98)
MONOCYTES # BLD AUTO: 0.3 K/UL (ref 0.3–1)
MONOCYTES NFR BLD: 8.5 % (ref 4–15)
NEUTROPHILS # BLD AUTO: 2 K/UL (ref 1.8–7.7)
NEUTROPHILS NFR BLD: 55.1 % (ref 38–73)
NONHDLC SERPL-MCNC: 139 MG/DL
NRBC BLD-RTO: 0 /100 WBC
PLATELET # BLD AUTO: 231 K/UL (ref 150–450)
PMV BLD AUTO: 10.1 FL (ref 9.2–12.9)
POTASSIUM SERPL-SCNC: 4.5 MMOL/L (ref 3.5–5.1)
PROT SERPL-MCNC: 6.8 G/DL (ref 6–8.4)
RBC # BLD AUTO: 5.07 M/UL (ref 4.6–6.2)
SODIUM SERPL-SCNC: 142 MMOL/L (ref 136–145)
TRIGL SERPL-MCNC: 48 MG/DL (ref 30–150)
WBC # BLD AUTO: 3.55 K/UL (ref 3.9–12.7)

## 2023-05-02 PROCEDURE — 80053 COMPREHEN METABOLIC PANEL: CPT | Performed by: INTERNAL MEDICINE

## 2023-05-02 PROCEDURE — 80061 LIPID PANEL: CPT | Performed by: INTERNAL MEDICINE

## 2023-05-02 PROCEDURE — 36415 COLL VENOUS BLD VENIPUNCTURE: CPT | Mod: PO | Performed by: INTERNAL MEDICINE

## 2023-05-02 PROCEDURE — 85025 COMPLETE CBC W/AUTO DIFF WBC: CPT | Performed by: INTERNAL MEDICINE

## 2023-05-02 NOTE — TELEPHONE ENCOUNTER
Patient states every time he has an physical annual appointment he has a urinalysis done with his other labs.  Please advise.

## 2023-05-02 NOTE — TELEPHONE ENCOUNTER
----- Message from Trey Garcia sent at 5/2/2023  8:18 AM CDT -----  Regarding: URINE ORDER REQUEST  Patient is requesting to have SPECIMEN order attached to his lab appointment. Order is not listed in EPIC.  Please enter order as patient has already completed blood work on today.

## 2023-05-05 ENCOUNTER — PATIENT MESSAGE (OUTPATIENT)
Dept: FAMILY MEDICINE | Facility: CLINIC | Age: 48
End: 2023-05-05
Payer: COMMERCIAL

## 2023-05-05 ENCOUNTER — TELEPHONE (OUTPATIENT)
Dept: FAMILY MEDICINE | Facility: CLINIC | Age: 48
End: 2023-05-05
Payer: COMMERCIAL

## 2023-05-05 PROBLEM — Z12.11 SCREENING FOR COLON CANCER: Status: ACTIVE | Noted: 2023-05-05

## 2023-05-05 NOTE — PROGRESS NOTES
This note was created by combination of typed  and M-Modal dictation.  Transcription errors may be present.  If there are any questions, please contact me.    Assessment and Plan:   Assessment and Plan   Normal physical exam  -pre visit labs reviewed  Working on therapeutic lifestyle modification (TLC)  Aware of the need for weight loss  -     CBC Without Differential; Future; Expected date: 05/05/2024  -     Comprehensive Metabolic Panel; Future; Expected date: 05/05/2024  -     Lipid Panel; Future; Expected date: 05/05/2024  -     Hemoglobin A1C; Future; Expected date: 05/08/2024    History of hypertension improved with diet/weight loss, off of meds 2019  -check home readings regularly, reports he is in the normotensive/pre hypertensive range.  History of losartan HCTZ.    BP better on repeat.    Continue working on TLC.    Microhematuria ever since his 20s.  -pre visit urinalysis normal no blood    Mild chronic anemia  -pre visit CBC normal    Male erectile disorder  -refilled Cialis for p.r.n. use  -     tadalafiL (CIALIS) 5 MG tablet; TAKE 1 TABLET(5 MG) BY MOUTH DAILY AS NEEDED FOR ERECTILE DYSFUNCTION  Dispense: 30 tablet; Refill: 5      Medications Discontinued During This Encounter   Medication Reason    tadalafiL (CIALIS) 5 MG tablet Reorder       meds sent this encounter:  Medications Ordered This Encounter   Medications    tadalafiL (CIALIS) 5 MG tablet     Sig: TAKE 1 TABLET(5 MG) BY MOUTH DAILY AS NEEDED FOR ERECTILE DYSFUNCTION     Dispense:  30 tablet     Refill:  5       Follow Up:  PEx 1 year; pt requesting a1c screening  Future Appointments   Date Time Provider Department [place]   5/1/2024  8:00 [facility]   5/8/2024  8:00 AM Demarcus Nieves MD [facility]            Subjective:   Subjective   Chief Complaint   Patient presents with    Annual Exam       HPI  Tevin is a 47 y.o. male.     Social History     Tobacco Use    Smoking status: Never    Smokeless  tobacco: Never   Substance Use Topics    Alcohol use: Yes     Alcohol/week: 0.0 standard drinks     Comment: social      Social History     Occupational History    Occupation: [employer] - ; formerly meter      Social History     Social History Narrative    [employer] graduate, he had played on the basketball team, and has worked offshore in safety and other capacities.         Last appointment with this clinic was Visit date not found. Last visit with me 5/6/2022   To summarize last visit and events leading up to today:  History of micro hematuria with negative workup.  Hypertension stable off of medications.  Diet controlled.    Mild chronic anemia  Vitiligo undergoing clinical study  2015 colonoscopy normal repeat 10 years    Pre visit labs  CBC normal   CMP normal   Lipid profile normal  UA normal.    Today's visit:  Please note: Chronic medical problems that are stable but are being addressed at this visit may not be listed/documented here, but may be addressed in more detail in the Assessment and Plan section.   Below are salient features of chronic medical conditions, or new/acute conditions and their details.    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 And so weight gain. Has been x 2 years.  Weight the same as last year but about 50 pound increase compared to fall 2020.      Home readings regularly.  Max of 140 but usually better.  This AM by recall 138/85.  BP high on intake.  Some white coat syndrome.   BP better on repeat today.    Walking 2-3 times a week.     No snoring  No chest pain  No dyspnea  No abd pain  No dysuria      Patient Care Team:  Demarcus Nieves MD as PCP - General (Internal Medicine)  Shaniqua Edwards MD as Consulting Physician (Urology)  Erick Back MD as Consulting Physician (Dermatology)  Bessy Sosa MA as Care Coordinator    Patient Active Problem List    Diagnosis Date Noted    Screening for colon cancer  "5/6/2015 colonoscopy normal 05/05/2023 5/6/2015 colonoscopy normal        Mild chronic anemia 10/22/2020    Microhematuria ever since his 20s. 08/10/2017     11/24/2008 CT abdomen pelvis bilateral renal hypodensities too small to characterize but likely cysts.  12/02/2008 urine cytology negative for malignancy  12/08/2008 cystoscopy normal with no etiology of hematuria.        History of hypertension improved with diet/weight loss, off of meds 2019 03/17/2013    Family history of diabetes mellitus 03/17/2013    Metatarsalgia 03/17/2013    Vitiligo 03/17/2013     Derm Dr. Back           PAST MEDICAL PROBLEMS, PAST SURGICAL HISTORY: please see relevant portions of the electronic medical record    ALLERGIES AND MEDICATIONS: updated and reviewed.  Review of patient's allergies indicates:  No Known Allergies    Medication List with Changes/Refills   Current Medications    TADALAFIL (CIALIS) 5 MG TABLET    TAKE 1 TABLET(5 MG) BY MOUTH DAILY AS NEEDED FOR ERECTILE DYSFUNCTION         Objective:   Objective   Physical Exam   Vitals:    05/08/23 0816 05/08/23 0833   BP: (!) 162/102 138/82   BP Location:  Left arm   Patient Position:  Sitting   BP Method:  Large (Manual)   Pulse: 100    Temp: 98.9 °F (37.2 °C)    TempSrc: Oral    SpO2: 98%    Weight: (!) 138.7 kg (305 lb 12.5 oz)    Height: 6' 6" (1.981 m)     Body mass index is 35.34 kg/m².  Weight: (!) 138.7 kg (305 lb 12.5 oz)   Height: 6' 6" (198.1 cm)     Physical Exam  Constitutional:       Appearance: Normal appearance. He is well-developed.   HENT:      Right Ear: Tympanic membrane and external ear normal.      Left Ear: Tympanic membrane and external ear normal.      Nose: Nose normal.   Eyes:      General: No scleral icterus.     Conjunctiva/sclera: Conjunctivae normal.   Neck:      Thyroid: No thyroid mass or thyromegaly.      Trachea: Trachea normal.   Cardiovascular:      Rate and Rhythm: Normal rate and regular rhythm.      Heart sounds: Normal heart " sounds, S1 normal and S2 normal. No murmur heard.  Pulmonary:      Effort: Pulmonary effort is normal.      Breath sounds: Normal breath sounds.   Abdominal:      General: There is no distension.      Palpations: Abdomen is soft. There is no hepatomegaly, splenomegaly or mass.      Tenderness: There is no abdominal tenderness.   Musculoskeletal:         General: No deformity.      Right lower leg: No edema.      Left lower leg: No edema.   Lymphadenopathy:      Cervical: No cervical adenopathy.   Skin:     General: Skin is warm and dry.      Findings: No rash (on exposed skin).      Comments: On exposed skin   Neurological:      Mental Status: He is alert and oriented to person, place, and time.      Cranial Nerves: No cranial nerve deficit.      Sensory: No sensory deficit.      Deep Tendon Reflexes: Reflexes are normal and symmetric.   Psychiatric:         Speech: Speech normal.         Behavior: Behavior normal.         Thought Content: Thought content normal.         Judgment: Judgment normal.       Component      Latest Ref Rng & Units 5/2/2023 4/30/2022   WBC      3.90 - 12.70 K/uL 3.55 (L) 3.27 (L)   RBC      4.60 - 6.20 M/uL 5.07 5.24   Hemoglobin      14.0 - 18.0 g/dL 14.0 14.6   Hematocrit      40.0 - 54.0 % 45.2 48.5   MCV      82 - 98 fL 89 93   MCH      27.0 - 31.0 pg 27.6 27.9   MCHC      32.0 - 36.0 g/dL 31.0 (L) 30.1 (L)   RDW      11.5 - 14.5 % 13.8 14.0   Platelets      150 - 450 K/uL 231 210   MPV      9.2 - 12.9 fL 10.1 10.6   Immature Granulocytes      0.0 - 0.5 % 0.0 0.3   Gran # (ANC)      1.8 - 7.7 K/uL 2.0 1.5 (L)   Immature Grans (Abs)      0.00 - 0.04 K/uL 0.00 0.01   Lymph #      1.0 - 4.8 K/uL 1.1 1.2   Mono #      0.3 - 1.0 K/uL 0.3 0.3   Eos #      0.0 - 0.5 K/uL 0.2 0.3   Baso #      0.00 - 0.20 K/uL 0.02 0.03   nRBC      0 /100 WBC 0 0   Gran %      38.0 - 73.0 % 55.1 46.2   Lymph %      18.0 - 48.0 % 30.4 36.7   Mono %      4.0 - 15.0 % 8.5 8.3   Eosinophil %      0.0 - 8.0 % 5.4  7.6   Basophil %      0.0 - 1.9 % 0.6 0.9   Differential Method       Automated Automated   Sodium      136 - 145 mmol/L 142 141   Potassium      3.5 - 5.1 mmol/L 4.5 4.1   Chloride      95 - 110 mmol/L 108 104   CO2      23 - 29 mmol/L 28 27   Glucose      70 - 110 mg/dL 92 93   BUN      6 - 20 mg/dL 7 8   Creatinine      0.5 - 1.4 mg/dL 1.0 0.9   Calcium      8.7 - 10.5 mg/dL 9.3 9.4   PROTEIN TOTAL      6.0 - 8.4 g/dL 6.8 7.2   Albumin      3.5 - 5.2 g/dL 3.8 3.9   BILIRUBIN TOTAL      0.1 - 1.0 mg/dL 0.7 1.2 (H)   Alkaline Phosphatase      55 - 135 U/L 62 65   AST      10 - 40 U/L 15 21   ALT      10 - 44 U/L 15 13   Anion Gap      8 - 16 mmol/L 6 (L) 10   eGFR if African American      >60 mL/min/1.73 m:2  >60.0   eGFR if non African American      >60 mL/min/1.73 m:2  >60.0   eGFR      >60 mL/min/1.73 m:2 >60.0    Specimen UA       Urine, Clean Catch    Color, UA      Yellow, Straw, Kathrin Colorless (A)    Appearance, UA      Clear Clear    pH, UA      5.0 - 8.0 7.0    Specific Gravity, UA      1.005 - 1.030 1.005    Protein, UA      Negative Negative    Glucose, UA      Negative Negative    Ketones, UA      Negative Negative    Bilirubin (UA)      Negative Negative    Occult Blood UA      Negative Negative    NITRITE UA      Negative Negative    Leukocytes, UA      Negative Negative    Cholesterol      120 - 199 mg/dL 180 207 (H)   Triglycerides      30 - 150 mg/dL 48 69   HDL      40 - 75 mg/dL 41 48   LDL Cholesterol External      63.0 - 159.0 mg/dL 129.4 145.2   HDL/Cholesterol Ratio      20.0 - 50.0 % 22.8 23.2   Total Cholesterol/HDL Ratio      2.0 - 5.0 4.4 4.3   Non-HDL Cholesterol      mg/dL 139 159   Hemoglobin A1C External      4.0 - 5.6 %  5.5   Estimated Avg Glucose      68 - 131 mg/dL  111   TSH      0.400 - 4.000 uIU/mL  1.113

## 2023-05-05 NOTE — TELEPHONE ENCOUNTER
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     Can the clinic reply by [facility]? No     Would the patient rather a call back or a response via [facility]? Call     Best call back number: [phone]         
Spoke with patient to bring required paperwork to scheduled appointment, COLBY  
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  Unfortunately that has to be filled out by occupational medicine/somewhere licensed to do that.  
none

## 2023-05-08 ENCOUNTER — OFFICE VISIT (OUTPATIENT)
Dept: FAMILY MEDICINE | Facility: CLINIC | Age: 48
End: 2023-05-08
Payer: COMMERCIAL

## 2023-05-08 ENCOUNTER — OCCUPATIONAL HEALTH (OUTPATIENT)
Dept: URGENT CARE | Facility: CLINIC | Age: 48
End: 2023-05-08

## 2023-05-08 VITALS
DIASTOLIC BLOOD PRESSURE: 82 MMHG | BODY MASS INDEX: 35.38 KG/M2 | TEMPERATURE: 99 F | HEIGHT: 78 IN | OXYGEN SATURATION: 98 % | SYSTOLIC BLOOD PRESSURE: 138 MMHG | HEART RATE: 100 BPM | WEIGHT: 305.75 LBS

## 2023-05-08 DIAGNOSIS — D64.9 MILD CHRONIC ANEMIA: ICD-10-CM

## 2023-05-08 DIAGNOSIS — Z02.89 ENCOUNTER FOR EXAMINATION REQUIRED BY DEPARTMENT OF TRANSPORTATION (DOT): Primary | ICD-10-CM

## 2023-05-08 DIAGNOSIS — N52.9 MALE ERECTILE DISORDER: ICD-10-CM

## 2023-05-08 DIAGNOSIS — Z00.00 NORMAL PHYSICAL EXAM: Primary | ICD-10-CM

## 2023-05-08 DIAGNOSIS — Z86.79 HISTORY OF HYPERTENSION: ICD-10-CM

## 2023-05-08 DIAGNOSIS — R31.29 MICROHEMATURIA: ICD-10-CM

## 2023-05-08 PROCEDURE — 99499 PHYSICAL, RECERT DOT/CDL: ICD-10-PCS | Mod: S$GLB,,, | Performed by: EMERGENCY MEDICINE

## 2023-05-08 PROCEDURE — 99396 PREV VISIT EST AGE 40-64: CPT | Mod: S$GLB,,, | Performed by: INTERNAL MEDICINE

## 2023-05-08 PROCEDURE — 99999 PR PBB SHADOW E&M-EST. PATIENT-LVL IV: ICD-10-PCS | Mod: PBBFAC,,, | Performed by: INTERNAL MEDICINE

## 2023-05-08 PROCEDURE — 99999 PR PBB SHADOW E&M-EST. PATIENT-LVL IV: CPT | Mod: PBBFAC,,, | Performed by: INTERNAL MEDICINE

## 2023-05-08 PROCEDURE — 99499 UNLISTED E&M SERVICE: CPT | Mod: S$GLB,,, | Performed by: EMERGENCY MEDICINE

## 2023-05-08 PROCEDURE — 99396 PR PREVENTIVE VISIT,EST,40-64: ICD-10-PCS | Mod: S$GLB,,, | Performed by: INTERNAL MEDICINE

## 2023-05-08 RX ORDER — TADALAFIL 5 MG/1
TABLET ORAL
Qty: 30 TABLET | Refills: 5 | Status: SHIPPED | OUTPATIENT
Start: 2023-05-08 | End: 2024-02-06

## 2023-05-08 NOTE — LETTER
May 8, 2023      [facility]  [address] [facility]  [place] [address]  Phone: [phone]  Fax: [phone]       Patient: Tevin Velazquez  YOB: 1975  Date of Visit: 5/8/23      To Whom It May Concern:    Tevin Velazquez  was at [facility] on 5/8/23. If you have any questions or concerns, or if I can be of further assistance, please do not hesitate to contact me.      Sincerely,        Demarcus Nieves MD

## 2023-08-24 ENCOUNTER — APPOINTMENT (RX ONLY)
Dept: URBAN - METROPOLITAN AREA CLINIC 321 | Facility: CLINIC | Age: 48
Setting detail: DERMATOLOGY
End: 2023-08-24

## 2023-08-24 DIAGNOSIS — D22 MELANOCYTIC NEVI: ICD-10-CM

## 2023-08-24 DIAGNOSIS — L57.0 ACTINIC KERATOSIS: ICD-10-CM

## 2023-08-24 DIAGNOSIS — L82.1 OTHER SEBORRHEIC KERATOSIS: ICD-10-CM

## 2023-08-24 DIAGNOSIS — D18.0 HEMANGIOMA: ICD-10-CM

## 2023-08-24 DIAGNOSIS — L57.3 POIKILODERMA OF CIVATTE: ICD-10-CM

## 2023-08-24 DIAGNOSIS — B36.0 PITYRIASIS VERSICOLOR: ICD-10-CM

## 2023-08-24 DIAGNOSIS — L81.4 OTHER MELANIN HYPERPIGMENTATION: ICD-10-CM

## 2023-08-24 PROBLEM — D22.4 MELANOCYTIC NEVI OF SCALP AND NECK: Status: ACTIVE | Noted: 2023-08-24

## 2023-08-24 PROBLEM — D22.9 MELANOCYTIC NEVI, UNSPECIFIED: Status: ACTIVE | Noted: 2023-08-24

## 2023-08-24 PROBLEM — D18.01 HEMANGIOMA OF SKIN AND SUBCUTANEOUS TISSUE: Status: ACTIVE | Noted: 2023-08-24

## 2023-08-24 PROCEDURE — ? REFUSAL OF TREATMENT

## 2023-08-24 PROCEDURE — ? TREATMENT REGIMEN

## 2023-08-24 PROCEDURE — 17003 DESTRUCT PREMALG LES 2-14: CPT

## 2023-08-24 PROCEDURE — ? LIQUID NITROGEN

## 2023-08-24 PROCEDURE — ? FULL BODY SKIN EXAM

## 2023-08-24 PROCEDURE — ? COUNSELING

## 2023-08-24 PROCEDURE — 99213 OFFICE O/P EST LOW 20 MIN: CPT | Mod: 25

## 2023-08-24 PROCEDURE — 17000 DESTRUCT PREMALG LESION: CPT

## 2023-08-24 ASSESSMENT — LOCATION DETAILED DESCRIPTION DERM
LOCATION DETAILED: LEFT VENTRAL DISTAL FOREARM
LOCATION DETAILED: LEFT INFERIOR LATERAL FOREHEAD
LOCATION DETAILED: RIGHT SUPERIOR PARIETAL SCALP
LOCATION DETAILED: LEFT MEDIAL MALAR CHEEK
LOCATION DETAILED: LEFT INFERIOR LATERAL NECK

## 2023-08-24 ASSESSMENT — LOCATION ZONE DERM
LOCATION ZONE: FACE
LOCATION ZONE: ARM
LOCATION ZONE: SCALP
LOCATION ZONE: NECK

## 2023-08-24 ASSESSMENT — LOCATION SIMPLE DESCRIPTION DERM
LOCATION SIMPLE: LEFT ANTERIOR NECK
LOCATION SIMPLE: SCALP
LOCATION SIMPLE: LEFT FOREARM
LOCATION SIMPLE: LEFT CHEEK
LOCATION SIMPLE: LEFT FOREHEAD

## 2023-08-24 NOTE — PROCEDURE: LIQUID NITROGEN
Duration Of Freeze Thaw-Cycle (Seconds): 0
Show Aperture Variable?: Yes
Post-Care Instructions: I reviewed with the patient in detail post-care instructions. Patient is to wear sunprotection, and avoid picking at any of the treated lesions. Pt may apply Vaseline to crusted or scabbing areas.
Consent: The patient's consent was obtained including but not limited to risks of crusting, scabbing, blistering, scarring, darker or lighter pigmentary change, recurrence, incomplete removal and infection.
Render Note In Bullet Format When Appropriate: No
Number Of Freeze-Thaw Cycles: 1 freeze-thaw cycle
Detail Level: Detailed

## 2023-10-28 ENCOUNTER — PATIENT MESSAGE (OUTPATIENT)
Dept: FAMILY MEDICINE | Facility: CLINIC | Age: 48
End: 2023-10-28
Payer: COMMERCIAL

## 2023-12-15 ENCOUNTER — OFFICE VISIT (OUTPATIENT)
Dept: FAMILY MEDICINE | Facility: CLINIC | Age: 48
End: 2023-12-15
Payer: COMMERCIAL

## 2023-12-15 ENCOUNTER — HOSPITAL ENCOUNTER (OUTPATIENT)
Dept: RADIOLOGY | Facility: HOSPITAL | Age: 48
Discharge: HOME | End: 2023-12-15
Payer: COMMERCIAL

## 2023-12-15 VITALS
HEART RATE: 98 BPM | WEIGHT: 315 LBS | BODY MASS INDEX: 36.45 KG/M2 | SYSTOLIC BLOOD PRESSURE: 151 MMHG | TEMPERATURE: 98 F | DIASTOLIC BLOOD PRESSURE: 98 MMHG | OXYGEN SATURATION: 96 % | HEIGHT: 78 IN

## 2023-12-15 DIAGNOSIS — N50.812 LEFT TESTICULAR PAIN: Primary | ICD-10-CM

## 2023-12-15 DIAGNOSIS — R03.0 ELEVATED BLOOD PRESSURE READING: ICD-10-CM

## 2023-12-15 DIAGNOSIS — N45.1 ACUTE EPIDIDYMITIS: ICD-10-CM

## 2023-12-15 DIAGNOSIS — R10.32 LEFT LOWER QUADRANT ABDOMINAL PAIN: ICD-10-CM

## 2023-12-15 LAB
BILIRUB UR QL STRIP: NEGATIVE
CLARITY UR REFRACT.AUTO: CLEAR
COLOR UR AUTO: YELLOW
GLUCOSE UR QL STRIP: NEGATIVE
HGB UR QL STRIP: ABNORMAL
KETONES UR QL STRIP: NEGATIVE
LEUKOCYTE ESTERASE UR QL STRIP: NEGATIVE
MICROSCOPIC COMMENT: ABNORMAL
NITRITE UR QL STRIP: NEGATIVE
PH UR STRIP: 5 [PH] (ref 5–8)
PROT UR QL STRIP: NEGATIVE
RBC #/AREA URNS AUTO: 12 /HPF (ref 0–4)
SP GR UR STRIP: 1.02 (ref 1–1.03)
SQUAMOUS #/AREA URNS AUTO: 0 /HPF
URN SPEC COLLECT METH UR: ABNORMAL
WBC #/AREA URNS AUTO: 0 /HPF (ref 0–5)

## 2023-12-15 PROCEDURE — 99214 OFFICE O/P EST MOD 30 MIN: CPT | Mod: S$GLB,,,

## 2023-12-15 PROCEDURE — 99999 PR PBB SHADOW E&M-EST. PATIENT-LVL IV: CPT | Mod: PBBFAC,,,

## 2023-12-15 PROCEDURE — 74019 XR ABDOMEN FLAT AND ERECT: ICD-10-PCS | Mod: 26,,, | Performed by: RADIOLOGY

## 2023-12-15 PROCEDURE — 74019 RADEX ABDOMEN 2 VIEWS: CPT | Mod: TC,FY,PO

## 2023-12-15 PROCEDURE — 74019 RADEX ABDOMEN 2 VIEWS: CPT | Mod: 26,,, | Performed by: RADIOLOGY

## 2023-12-15 PROCEDURE — 99999 PR PBB SHADOW E&M-EST. PATIENT-LVL IV: ICD-10-PCS | Mod: PBBFAC,,,

## 2023-12-15 PROCEDURE — 81001 URINALYSIS AUTO W/SCOPE: CPT

## 2023-12-15 PROCEDURE — 87491 CHLMYD TRACH DNA AMP PROBE: CPT

## 2023-12-15 PROCEDURE — 99214 PR OFFICE/OUTPT VISIT, EST, LEVL IV, 30-39 MIN: ICD-10-PCS | Mod: S$GLB,,,

## 2023-12-15 RX ORDER — LEVOFLOXACIN 500 MG/1
500 TABLET, FILM COATED ORAL DAILY
Qty: 10 TABLET | Refills: 0 | Status: SHIPPED | OUTPATIENT
Start: 2023-12-15 | End: 2023-12-25

## 2023-12-15 NOTE — PATIENT INSTRUCTIONS
Xray, labs, and urine today.     Will treat for epididymitis. Return for fever or worsening s/s.     Complete course of antibiotics. Can take tylenol or ibuprofen for pain relief. 650 mg of tylenol every 6 hours or you may take over the counter ibuprofen 400mg (2 tabs) to 800mg (4 tabs) up to every 6 hours as needed for pain/inflammation OR naproxen 220mg (1 tab) to 440mg (2 tabs) every 12 hours as needed for pain/inflammation.  If you are taking the higher dosages, take with some food.  Also, avoid taking the higher dosage of ibuprofren every 6 or naproxen every 12 hours for more than 48-72 hours.  If you start to have abdominal pain, dark stools, or vomiting after doses, stop taking this medication immediately and contact a physician.  Do not take any other NSAIDs (non-steroidal anti-inflammatory drugs) in addition to your ibuprofen.  If you are unsure if a medication is an NSAID you may ask your pharmacist or call the office.

## 2023-12-15 NOTE — PROGRESS NOTES
HPI     Chief Complaint:  Chief Complaint   Patient presents with    Abdominal Pain     Started last week       Tevin Velazquez is a 48 y.o. male with multiple medical diagnoses as listed in the medical history and problem list that presents for LLQ abd pain and testicular pain.  Pt is new to me but seen in clinic with last appointment Visit date not found.      HPI    Abd pain: Pain to let side of abd x 1 week ago some lower back to left side and LLQ. Also reports slight left testicular pain to left testicle with mild swelling. Denies fever or urinary s/s. No hematuria. Poor diet lately, feeling bloated. Normal BM this monring no blood.     HTN: Elevated BP in clinic: pt previously on BP medication but stopped after losing weight. Has recently gained weight and not going to gym. Last treatment was in 2019. Losartan, hctz cardura, amlodipine     Last colonoscopy 2015, due in 2025.     Assessment & Plan       Problem List Items Addressed This Visit    None  Visit Diagnoses       Left testicular pain    -  Primary  See below    Relevant Orders    Urinalysis, Reflex to Urine Culture Urine, Clean Catch    C. trachomatis/N. gonorrhoeae by AMP DNA [facility]; Urine    Left lower quadrant abdominal pain      DDx diverticulitis, pyelonephritis, kidney stone  No flank pain, denies urinary s/s. Pain is not severe. More suspicious for epididymitis given history and exam.     Relevant Orders    X-Ray Abdomen Flat And Erect (Completed)    CBC Auto Differential    C-REACTIVE PROTEIN    Comprehensive Metabolic Panel    Acute epididymitis      Levaquin 500mg daily x 10 days  Tylenol/ibuprofen   Rest    History of, US of scrotum in 2014. Pt reports pain is similar to infection in the past. No concerns for STDs. Sexually active with single female partner.   UA, mitchell/chlam, labs and xray    Relevant Medications    levoFLOXacin (LEVAQUIN) 500 MG tablet    Elevated blood pressure reading      DASH diet, return in 2 weeks for nurse  visit. Consider restarted BP medication. Pt previously on Losartan, hctz, amlodipine and cardura. Pt denies chest pain or SOB. Does admits recent weight gain lately.             --------------------------------------------      Health Maintenance:  Health Maintenance         Date Due Completion Date    COVID-19 Vaccine (5 - 2023-24 season) 12/15/2023 10/20/2023    Hemoglobin A1c (Diabetic Prevention Screening) 04/30/2025 4/30/2022    Colorectal Cancer Screening 05/06/2025 5/6/2015    TETANUS VACCINE 08/10/2027 8/10/2017    Lipid Panel 05/02/2028 5/2/2023            Follow Up:  Follow up if symptoms worsen or fail to improve.    Discussed DDx, condition, and treatment.   Education sent to patient portal/included in after visit summary.  ED precautions given.   Notify provider if symptoms do not resolve or increase in severity.   Patient verbalizes understanding and agrees with plan of care.      Exam     Review of Systems:  (as noted above)  Review of Systems    Physical Exam:   Physical Exam  Vitals reviewed. Exam conducted with a chaperone present (Dominguez Zabala MA).   Constitutional:       General: He is not in acute distress.     Appearance: Normal appearance. He is obese. He is not toxic-appearing.   Cardiovascular:      Rate and Rhythm: Normal rate and regular rhythm.   Pulmonary:      Effort: Pulmonary effort is normal.   Abdominal:      General: Bowel sounds are normal. There is no distension.      Palpations: Abdomen is soft. There is no mass.      Tenderness: There is abdominal tenderness. There is no right CVA tenderness, left CVA tenderness, guarding or rebound.      Hernia: No hernia is present.       Genitourinary:     Pubic Area: No rash.       Penis: Uncircumcised. No paraphimosis, hypospadias, erythema, tenderness, discharge, swelling or lesions.       Testes: Cremasteric reflex is present.         Left: Mass, tenderness or swelling not present. Cremasteric reflex is present.       Epididymis:       "Left: Inflamed and enlarged. Tenderness present.       Musculoskeletal:         General: Normal range of motion.      Cervical back: Normal range of motion. No rigidity.      Right lower leg: No edema.      Left lower leg: No edema.   Skin:     General: Skin is warm.      Capillary Refill: Capillary refill takes less than 2 seconds.      Coloration: Skin is not jaundiced.      Findings: No bruising, erythema or rash.   Neurological:      General: No focal deficit present.      Mental Status: He is alert and oriented to person, place, and time.   Psychiatric:         Mood and Affect: Mood normal.         Behavior: Behavior normal.       Vitals:    12/15/23 1330   BP: (!) 151/98   Pulse: 98   Temp: 98.4 °F (36.9 °C)   TempSrc: Oral   SpO2: 96%   Weight: (!) 144.7 kg (319 lb 0.1 oz)   Height: 6' 6" (1.981 m)      Body mass index is 36.86 kg/m².        History     Past Medical History:  Past Medical History:   Diagnosis Date    Hypertension        Past Surgical History:  Past Surgical History:   Procedure Laterality Date    knee arthro Right     hx of meniscal tear       Social History:  Social History     Socioeconomic History    Marital status:    Occupational History    Occupation: [employer] - ; formerly meter   Tobacco Use    Smoking status: Never    Smokeless tobacco: Never   Substance and Sexual Activity    Alcohol use: Yes     Alcohol/week: 0.0 standard drinks of alcohol     Comment: social    Drug use: No    Sexual activity: Yes     Partners: Female   Social History Narrative    [employer] Cont[employer] graduate, he had played on the basketball team, and has worked offshore in safety and other capacities.       Social Determinants of Health     Food Insecurity: Unknown (12/15/2023)    Hunger Vital Sign     Ran Out of Food in the Last Year: Never true   Transportation Needs: No Transportation Needs (12/15/2023)    PRAPARE - Transportation     Lack of Transportation (Medical): No     Lack of " Transportation (Non-Medical): No   Physical Activity: Unknown (12/15/2023)    Exercise Vital Sign     Days of Exercise per Week: 3 days   Stress: No Stress Concern Present (12/15/2023)    [employer] - Occupational Stress Questionnaire     Feeling of Stress : Not at all   Social Connections: Unknown (12/15/2023)    Social Connection and Isolation Panel [NHANES]     Frequency of Communication with Friends and Family: More than three times a week     Frequency of Social Gatherings with Friends and Family: Twice a week     Attends Club or Organization Meetings: More than 4 times per year     Marital Status:    Housing Stability: Low Risk  (12/15/2023)    Housing Stability Vital Sign     Unable to Pay for Housing in the Last Year: No     Number of Places Lived in the Last Year: 0     Unstable Housing in the Last Year: No       Family History:  Family History   Problem Relation Age of Onset    Hypertension Mother     Ovarian cysts Mother     Glaucoma Mother     Hypertension Father     Melanoma Father     Diabetes Sister     Hypertension Brother     Benign prostatic hyperplasia Brother     Gallbladder disease Brother     Thyroid disease Sister     No Known Problems Son     No Known Problems Son     Autism Son     No Known Problems Maternal Aunt     No Known Problems Maternal Uncle     No Known Problems Paternal Aunt     No Known Problems Paternal Uncle     No Known Problems Maternal Grandmother     No Known Problems Maternal Grandfather     No Known Problems Paternal Grandmother     No Known Problems Paternal Grandfather     Amblyopia Neg Hx     Blindness Neg Hx     Cancer Neg Hx     Cataracts Neg Hx     Macular degeneration Neg Hx     Retinal detachment Neg Hx     Strabismus Neg Hx     Stroke Neg Hx        Allergies and Medications: (updated and reviewed)  Review of patient's allergies indicates:  No Known Allergies  Current Outpatient Medications   Medication Sig Dispense Refill     tadalafiL (CIALIS) 5 MG tablet TAKE 1 TABLET(5 MG) BY MOUTH DAILY AS NEEDED FOR ERECTILE DYSFUNCTION 30 tablet 5    levoFLOXacin (LEVAQUIN) 500 MG tablet Take 1 tablet (500 mg total) by mouth once daily. for 10 days 10 tablet 0     No current facility-administered medications for this visit.       Patient Care Team:  Demarcus Nieves MD as PCP - General (Internal Medicine)  Shaniqua Edwards MD as Consulting Physician (Urology)  Erick Back MD as Consulting Physician (Dermatology)  Bessy Sosa MA as Care Coordinator         - The patient is given an After Visit Summary that lists all medications with directions, allergies, education, orders placed during this encounter and follow-up instructions.      - I have reviewed the patient's medical information including past medical, family, and social history sections including the medications and allergies.      - We discussed the patient's current medications.     This note was created by combination of typed  and MModal dictation.  Transcription errors may be present.  If there are any questions, please contact me.

## 2023-12-17 DIAGNOSIS — R31.9 HEMATURIA, UNSPECIFIED TYPE: ICD-10-CM

## 2023-12-17 DIAGNOSIS — N45.1 ACUTE EPIDIDYMITIS: Primary | ICD-10-CM

## 2023-12-18 LAB
C TRACH DNA SPEC QL NAA+PROBE: NOT DETECTED
N GONORRHOEA DNA SPEC QL NAA+PROBE: NOT DETECTED

## 2023-12-20 ENCOUNTER — TELEPHONE (OUTPATIENT)
Dept: FAMILY MEDICINE | Facility: CLINIC | Age: 48
End: 2023-12-20
Payer: COMMERCIAL

## 2023-12-29 ENCOUNTER — CLINICAL SUPPORT (OUTPATIENT)
Dept: FAMILY MEDICINE | Facility: CLINIC | Age: 48
End: 2023-12-29
Payer: COMMERCIAL

## 2023-12-29 VITALS — DIASTOLIC BLOOD PRESSURE: 88 MMHG | SYSTOLIC BLOOD PRESSURE: 142 MMHG | HEART RATE: 96 BPM

## 2023-12-29 DIAGNOSIS — R03.0 ELEVATED BLOOD PRESSURE READING: Primary | ICD-10-CM

## 2023-12-29 PROCEDURE — 99999 PR PBB SHADOW E&M-EST. PATIENT-LVL I: ICD-10-PCS | Mod: PBBFAC,,,

## 2023-12-29 PROCEDURE — 99999 PR PBB SHADOW E&M-EST. PATIENT-LVL I: CPT | Mod: PBBFAC,,,

## 2023-12-29 NOTE — PROGRESS NOTES
Tevin Velazquez 48 y.o. male is here today for Blood Pressure check.   History of HTN yes.    Review of patient's allergies indicates:  No Known Allergies  Creatinine   Date Value Ref Range Status   12/15/2023 0.9 0.5 - 1.4 mg/dL Final     Sodium   Date Value Ref Range Status   12/15/2023 140 136 - 145 mmol/L Final     Potassium   Date Value Ref Range Status   12/15/2023 4.1 3.5 - 5.1 mmol/L Final   ]  Patient denies taking blood pressure medications on a regular basis at the same time of the day.     Current Outpatient Medications:     tadalafiL (CIALIS) 5 MG tablet, TAKE 1 TABLET(5 MG) BY MOUTH DAILY AS NEEDED FOR ERECTILE DYSFUNCTION, Disp: 30 tablet, Rfl: 5  Does patient have record of home blood pressure readings yes. Readings have been averaging 140/80.   Patient is asymptomatic.     BP: (!) 142/88 , Pulse: 96 .    Blood pressure reading after 15 minutes was 142/88, Pulse 96.  Dr. Nieves notified.

## 2024-01-04 ENCOUNTER — OFFICE VISIT (OUTPATIENT)
Dept: UROLOGY | Facility: CLINIC | Age: 49
End: 2024-01-04
Payer: COMMERCIAL

## 2024-01-04 VITALS — BODY MASS INDEX: 37.53 KG/M2 | WEIGHT: 315 LBS

## 2024-01-04 DIAGNOSIS — R31.29 MICROHEMATURIA: ICD-10-CM

## 2024-01-04 DIAGNOSIS — N45.1 EPIDIDYMITIS: Primary | ICD-10-CM

## 2024-01-04 PROCEDURE — 99203 OFFICE O/P NEW LOW 30 MIN: CPT | Mod: S$GLB,,, | Performed by: UROLOGY

## 2024-01-04 PROCEDURE — 99999 PR PBB SHADOW E&M-EST. PATIENT-LVL III: CPT | Mod: PBBFAC,,, | Performed by: UROLOGY

## 2024-01-04 NOTE — PROGRESS NOTES
Subjective:       Tevin Velazquez is a 48 y.o. male who is a new patient who was referred by Sidra Villafana  for evaluation of epididymitis.      He was seen by PCP recently with L testicular and LLQ pain. UA showed 12 RBCs at that time (know long h/o microhematuria - prior workup. Mother also with chronic microhematuria). No urine culture sent. GC/CT negative. No scrotal imaging done. Given Levaquin x 10d.    Prior h/o epididymitis years ago - about 2014. Symptoms were similar to current episode. Denies current LUTS.       The following portions of the patient's history were reviewed and updated as appropriate: allergies, current medications, past family history, past medical history, past social history, past surgical history and problem list.    Review of Systems  Twelve point review of systems completed. Pertinent positive and negatives listed in HPI.      Objective:    Vitals: Wt (!) 147.3 kg (324 lb 11.8 oz)   BMI 37.53 kg/m²     Physical Exam   General: well developed, well nourished in no acute distress  Head: normocephalic, atraumatic  Neck: no obvious enlargement of thyroid  HEENT: EOMI, mucus membranes moist, sclera anicteric, no hearing impairment  Lungs: symmetric expansion, non-labored breathing  Neuro: alert and oriented x 3, no gross deficits  Psych: normal judgment and insight, normal mood/affect and non-anxious  Genitourinary:   Scrotum  - no lesions or rashes, no hydrocele or hernia.  Testes - descended bilaterally, symmetric without masses, non tender.  Epididymides - no cysts or lesions, no spermatocele, enlarged on L   NELI: deferred      Lab Review   Urine analysis today in clinic shows Negative     Lab Results   Component Value Date    WBC 3.83 (L) 12/15/2023    HGB 14.1 12/15/2023    HCT 44.7 12/15/2023    MCV 87 12/15/2023     12/15/2023     Lab Results   Component Value Date    CREATININE 0.9 12/15/2023    BUN 8 12/15/2023     Lab Results   Component Value Date    PSA 0.66  "03/28/2016     No results found for: "PSADIAG"    Imaging  DAVY 2014  Reports and images were personally reviewed by me and discussed with the patient today         Assessment/Plan:      1. Epididymitis     - Improved with Levaquin   - Okay to hold on repeat imaging   - No significant LUTS     2. Microhematuria    - Long history   - Prior negative workup    - UA clear today       Follow up PRN         "

## 2024-02-06 DIAGNOSIS — N52.9 MALE ERECTILE DISORDER: ICD-10-CM

## 2024-02-06 RX ORDER — TADALAFIL 5 MG/1
TABLET ORAL
Qty: 30 TABLET | Refills: 5 | Status: SHIPPED | OUTPATIENT
Start: 2024-02-06 | End: 2024-05-08 | Stop reason: SDUPTHER

## 2024-02-07 NOTE — TELEPHONE ENCOUNTER
Refill Authorization Note   Tevin Velazquez  is requesting a refill authorization.  Brief Assessment and Rationale for Refill:  Approve     Medication Therapy Plan:       Medication Reconciliation Completed: No   Comments:     No Care Gaps recommended.     Note composed:7:52 PM 02/06/2024

## 2024-02-07 NOTE — TELEPHONE ENCOUNTER
No care due was identified.  [facility] Embedded Care Due Messages. Reference number: [id number].   2/06/2024 6:48:59 PM CST

## 2024-04-25 ENCOUNTER — PATIENT OUTREACH (OUTPATIENT)
Dept: ADMINISTRATIVE | Facility: HOSPITAL | Age: 49
End: 2024-04-25
Payer: COMMERCIAL

## 2024-05-01 ENCOUNTER — PATIENT MESSAGE (OUTPATIENT)
Dept: FAMILY MEDICINE | Facility: CLINIC | Age: 49
End: 2024-05-01
Payer: COMMERCIAL

## 2024-05-01 ENCOUNTER — LAB VISIT (OUTPATIENT)
Dept: LAB | Facility: HOSPITAL | Age: 49
End: 2024-05-01
Attending: INTERNAL MEDICINE
Payer: COMMERCIAL

## 2024-05-01 DIAGNOSIS — Z00.00 NORMAL PHYSICAL EXAM: ICD-10-CM

## 2024-05-01 DIAGNOSIS — Z00.00 NORMAL PHYSICAL EXAM: Primary | ICD-10-CM

## 2024-05-01 DIAGNOSIS — R31.29 MICROHEMATURIA: ICD-10-CM

## 2024-05-01 LAB
ALBUMIN SERPL BCP-MCNC: 3.9 G/DL (ref 3.5–5.2)
ALP SERPL-CCNC: 65 U/L (ref 55–135)
ALT SERPL W/O P-5'-P-CCNC: 14 U/L (ref 10–44)
ANION GAP SERPL CALC-SCNC: 7 MMOL/L (ref 8–16)
AST SERPL-CCNC: 16 U/L (ref 10–40)
BILIRUB SERPL-MCNC: 1.4 MG/DL (ref 0.1–1)
BUN SERPL-MCNC: 8 MG/DL (ref 6–20)
CALCIUM SERPL-MCNC: 9.4 MG/DL (ref 8.7–10.5)
CHLORIDE SERPL-SCNC: 104 MMOL/L (ref 95–110)
CHOLEST SERPL-MCNC: 208 MG/DL (ref 120–199)
CHOLEST/HDLC SERPL: 5.1 {RATIO} (ref 2–5)
CO2 SERPL-SCNC: 28 MMOL/L (ref 23–29)
CREAT SERPL-MCNC: 1.1 MG/DL (ref 0.5–1.4)
ERYTHROCYTE [DISTWIDTH] IN BLOOD BY AUTOMATED COUNT: 15.2 % (ref 11.5–14.5)
EST. GFR  (NO RACE VARIABLE): >60 ML/MIN/1.73 M^2
ESTIMATED AVG GLUCOSE: 117 MG/DL (ref 68–131)
GLUCOSE SERPL-MCNC: 90 MG/DL (ref 70–110)
HBA1C MFR BLD: 5.7 % (ref 4–5.6)
HCT VFR BLD AUTO: 46 % (ref 40–54)
HDLC SERPL-MCNC: 41 MG/DL (ref 40–75)
HDLC SERPL: 19.7 % (ref 20–50)
HGB BLD-MCNC: 14.4 G/DL (ref 14–18)
LDLC SERPL CALC-MCNC: 150.8 MG/DL (ref 63–159)
MCH RBC QN AUTO: 27.4 PG (ref 27–31)
MCHC RBC AUTO-ENTMCNC: 31.3 G/DL (ref 32–36)
MCV RBC AUTO: 88 FL (ref 82–98)
NONHDLC SERPL-MCNC: 167 MG/DL
PLATELET # BLD AUTO: 227 K/UL (ref 150–450)
PMV BLD AUTO: 10.2 FL (ref 9.2–12.9)
POTASSIUM SERPL-SCNC: 4.1 MMOL/L (ref 3.5–5.1)
PROT SERPL-MCNC: 7 G/DL (ref 6–8.4)
RBC # BLD AUTO: 5.25 M/UL (ref 4.6–6.2)
SODIUM SERPL-SCNC: 139 MMOL/L (ref 136–145)
TRIGL SERPL-MCNC: 81 MG/DL (ref 30–150)
WBC # BLD AUTO: 4.03 K/UL (ref 3.9–12.7)

## 2024-05-01 PROCEDURE — 85027 COMPLETE CBC AUTOMATED: CPT | Performed by: INTERNAL MEDICINE

## 2024-05-01 PROCEDURE — 83036 HEMOGLOBIN GLYCOSYLATED A1C: CPT | Performed by: INTERNAL MEDICINE

## 2024-05-01 PROCEDURE — 36415 COLL VENOUS BLD VENIPUNCTURE: CPT | Mod: PO | Performed by: INTERNAL MEDICINE

## 2024-05-01 PROCEDURE — 80061 LIPID PANEL: CPT | Performed by: INTERNAL MEDICINE

## 2024-05-01 PROCEDURE — 80053 COMPREHEN METABOLIC PANEL: CPT | Performed by: INTERNAL MEDICINE

## 2024-05-07 NOTE — PROGRESS NOTES
This note was created by combination of typed  and M-Modal dictation.  Transcription errors may be present.  If there are any questions, please contact me.    Assessment and Plan:   Assessment and Plan   Normal physical exam  Screening for colon cancer 5/6/2015 colonoscopy normal  -pre visit labs reviewed  Less restricted diet, plans to reinstitute vegan diet  Update colonoscopy; notes some streaky blood with wiping.  Last colonoscopy 2015 negative, so is just about due  -     CBC Without Differential; Future; Expected date: 05/07/2025  -     Comprehensive Metabolic Panel; Future; Expected date: 05/07/2025  -     Lipid Panel; Future; Expected date: 05/07/2025  -     Hemoglobin A1C; Future; Expected date: 05/07/2025  -     Urinalysis; Future; Expected date: 05/07/2025  -     Ambulatory referral/consult to Endo Procedure ; Future; Expected date: 05/09/2024    Essential hypertension  -weight gain with resultant elevated BP again.  History of losartan hctz and history of amlodipine.  Plans to institute vegan diet again and hopefully weight loss. In the interim, restart losartan hctz  -     losartan-hydrochlorothiazide 100-25 mg (HYZAAR) 100-25 mg per tablet; Take 1 tablet by mouth once daily.  Dispense: 90 tablet; Refill: 3    Microhematuria ever since his 20s.  -negative UA, history of negative workup    Mild chronic anemia  -stable.    Abnormal glucose  -new. Work on therapeutic lifestyle modification (TLC).    Male erectile disorder  -refilled cialis  -     tadalafiL (CIALIS) 5 MG tablet; TAKE 1 TABLET(5 MG) BY MOUTH DAILY AS NEEDED FOR ERECTILE DYSFUNCTION  Dispense: 30 tablet; Refill: 5      Medications Discontinued During This Encounter   Medication Reason    tadalafiL (CIALIS) 5 MG tablet Reorder       meds sent this encounter:  Medications Ordered This Encounter   Medications    losartan-hydrochlorothiazide 100-25 mg (HYZAAR) 100-25 mg per tablet     Sig: Take 1 tablet by mouth once daily.      Dispense:  90 tablet     Refill:  3     .    tadalafiL (CIALIS) 5 MG tablet     Sig: TAKE 1 TABLET(5 MG) BY MOUTH DAILY AS NEEDED FOR ERECTILE DYSFUNCTION     Dispense:  30 tablet     Refill:  5     Pharmacy update refills, keep on file, not requesting Rx to be filled today.         Follow Up: Physical examination 1 year.  Nurse visit 3 weeks for BP check on losartan hctz restart  Future Appointments   Date Time Provider Department Center   5/15/2024  9:40 AM PRIV PRE-ADMIT, ENDO -[facility] ENDOPP4 [facility]   5/29/2024  8:00 AM NURSE, [facility] FAM MED/INT MED [facility] FAM MED [place]   5/1/2025  8:00 AM SPECIMEN, GAETANO CARDENAS SPECLAB [place]   5/1/2025  8:15 AM LABTITUS [facility] LAB [place]          Subjective:   Subjective   Chief Complaint   Patient presents with    Annual Exam       HPI  Rayshard is a 48 y.o. male.     Social History     Tobacco Use    Smoking status: Never    Smokeless tobacco: Never   Substance Use Topics    Alcohol use: Yes     Alcohol/week: 0.0 standard drinks of alcohol     Comment: social      Social History     Occupational History    Occupation: [employer] - ; formerly meter      Social History     Social History Narrative    [employer] graduate, he had played on the basketball team, and has worked offshore in safety and other capacities.         Last appointment with this clinic was 12/29/2023. Last visit with me 5/8/2023   To summarize last visit and events leading up to today:  History of micro hematuria with negative workup.  Hypertension stable off of medications.  Diet controlled.    Mild chronic anemia  Vitiligo undergoing clinical study  2015 colonoscopy normal repeat 10 years  New role at work - more office based, no longer in the field so less physical.     Saw NP 12/2023 for L testis pain. Treated for epididymitis with LQ  Saw urology in follow up.  Resolved, no additional testing    Pre visit labs  CBC normal   CMP normal   Lipid profile not  ideal with non    A1c 5.7 from 5.5  Urinalysis normal    Today's visit:      Blood pressure high on intake, better on repeat.  But he has been checking his blood pressures at home and by recall systolic in the 140s up to 150s.  Had previously been able to control this with dietary changes.  Had gone vegan for awhile.  But he is gone back to eating meat, eating more refined sugars as well.    He is exercising, walking for an hour several times a week.  His job is more sedentary and before he was getting more physical activity with work as well as additional walking.    Looking at his weight trends, he had been down to around 250 lb back in 2020 but now above 300 lb.    He is agreeable to restart blood pressure medication.    He plans to reinstitute dietary modification as he had done previously.      Notes also streaky bright red blood with wiping that occurs maybe every 2 or 3 weeks.  Not consistent.  Sometimes get an irritated sensation down there.  Last colonoscopy was 2015 so he is almost due for repeat.  He would be agreeable for repeat colonoscopy     Otherwise no complaints.    Patient Care Team:  Demarcus Nieves MD as PCP - General (Internal Medicine)  Shaniqua Edwards MD as Consulting Physician (Urology)  rEick Back MD as Consulting Physician (Dermatology)  Tamia Packer MA as Care Coordinator    Patient Active Problem List    Diagnosis Date Noted    Screening for colon cancer 5/6/2015 colonoscopy normal 05/05/2023 5/6/2015 colonoscopy normal      Mild chronic anemia 10/22/2020    Microhematuria ever since his 20s. 08/10/2017     11/24/2008 CT abdomen pelvis bilateral renal hypodensities too small to characterize but likely cysts.  12/02/2008 urine cytology negative for malignancy  12/08/2008 cystoscopy normal with no etiology of hematuria.      History of hypertension improved with diet/weight loss, off of meds 2019 03/17/2013    Family history of diabetes mellitus 03/17/2013  "   Metatarsalgia 03/17/2013    Vitiligo 03/17/2013     Derm Dr. Back         PAST MEDICAL PROBLEMS, PAST SURGICAL HISTORY: please see relevant portions of the electronic medical record    ALLERGIES AND MEDICATIONS: updated and reviewed.  Medication List with Changes/Refills   Current Medications    TADALAFIL (CIALIS) 5 MG TABLET    TAKE 1 TABLET(5 MG) BY MOUTH DAILY AS NEEDED FOR ERECTILE DYSFUNCTION    Review of Systems   Constitutional:  Negative for fever, malaise/fatigue and weight loss.   HENT:  Negative for congestion.    Eyes:  Negative for blurred vision and pain.   Respiratory:  Negative for shortness of breath and wheezing.    Cardiovascular:  Negative for chest pain, palpitations and leg swelling.   Gastrointestinal:  Positive for blood in stool. Negative for abdominal pain, constipation, diarrhea and heartburn.   Genitourinary:  Negative for dysuria, hematuria and urgency.   Neurological:  Negative for tingling, focal weakness, weakness and headaches.          Objective:   Objective   Physical Exam   Vitals:    05/08/24 0801 05/08/24 0857   BP: (!) 150/104 138/80   BP Location: Left arm Left arm   Patient Position: Sitting Sitting   BP Method: Large (Manual) Large (Manual)   Pulse: 87    Temp: 99.1 °F (37.3 °C)    TempSrc: Oral    SpO2: 97%    Weight: (!) 144.2 kg (317 lb 12.7 oz)    Height: 6' 6" (1.981 m)     Body mass index is 36.72 kg/m².            Physical Exam  Constitutional:       Appearance: Normal appearance. He is well-developed.   HENT:      Right Ear: Tympanic membrane and external ear normal.      Left Ear: Tympanic membrane and external ear normal.      Nose: Nose normal.   Eyes:      General: No scleral icterus.     Conjunctiva/sclera: Conjunctivae normal.   Neck:      Thyroid: No thyroid mass or thyromegaly.      Trachea: Trachea normal.   Cardiovascular:      Rate and Rhythm: Normal rate and regular rhythm.      Heart sounds: Normal heart sounds, S1 normal and S2 normal. No murmur " heard.  Pulmonary:      Effort: Pulmonary effort is normal.      Breath sounds: Normal breath sounds.   Abdominal:      General: There is no distension.      Palpations: Abdomen is soft. There is no hepatomegaly, splenomegaly or mass.      Tenderness: There is no abdominal tenderness.   Musculoskeletal:         General: No deformity.      Right lower leg: No edema.      Left lower leg: No edema.   Lymphadenopathy:      Cervical: No cervical adenopathy.   Skin:     General: Skin is warm and dry.      Findings: No rash (on exposed skin).      Comments: On exposed skin   Neurological:      Mental Status: He is alert and oriented to person, place, and time.      Cranial Nerves: No cranial nerve deficit.      Sensory: No sensory deficit.      Deep Tendon Reflexes: Reflexes are normal and symmetric.   Psychiatric:         Speech: Speech normal.         Behavior: Behavior normal.         Thought Content: Thought content normal.         Judgment: Judgment normal.         Component      Latest Ref Rng 4/30/2022 5/2/2023 5/1/2024   WBC      3.90 - 12.70 K/uL  3.55 (L)  4.03    RBC      4.60 - 6.20 M/uL  5.07  5.25    Hemoglobin      14.0 - 18.0 g/dL  14.0  14.4    Hematocrit      40.0 - 54.0 %  45.2  46.0    MCV      82 - 98 fL  89  88    MCH      27.0 - 31.0 pg  27.6  27.4    MCHC      32.0 - 36.0 g/dL  31.0 (L)  31.3 (L)    RDW      11.5 - 14.5 %  13.8  15.2 (H)    Platelet Count      150 - 450 K/uL  231  227    MPV      9.2 - 12.9 fL  10.1  10.2    Immature Granulocytes      0.0 - 0.5 %  0.0     Gran # (ANC)      1.8 - 7.7 K/uL  2.0     Immature Grans (Abs)      0.00 - 0.04 K/uL  0.00     Lymph #      1.0 - 4.8 K/uL  1.1     Mono #      0.3 - 1.0 K/uL  0.3     Eos #      0.0 - 0.5 K/uL  0.2     Baso #      0.00 - 0.20 K/uL  0.02     nRBC      0 /100 WBC  0     Gran %      38.0 - 73.0 %  55.1     Lymph %      18.0 - 48.0 %  30.4     Mono %      4.0 - 15.0 %  8.5     Eos %      0.0 - 8.0 %  5.4     Basophil %      0.0 - 1.9  %  0.6     Differential Method  Automated     Sodium      136 - 145 mmol/L  142  139    Potassium      3.5 - 5.1 mmol/L  4.5  4.1    Chloride      95 - 110 mmol/L  108  104    CO2      23 - 29 mmol/L  28  28    Glucose      70 - 110 mg/dL  92  90    BUN      6 - 20 mg/dL  7  8    Creatinine      0.5 - 1.4 mg/dL  1.0  1.1    Calcium      8.7 - 10.5 mg/dL  9.3  9.4    PROTEIN TOTAL      6.0 - 8.4 g/dL  6.8  7.0    Albumin      3.5 - 5.2 g/dL  3.8  3.9    BILIRUBIN TOTAL      0.1 - 1.0 mg/dL  0.7  1.4 (H)    ALP      55 - 135 U/L  62  65    AST      10 - 40 U/L  15  16    ALT      10 - 44 U/L  15  14    Anion Gap      8 - 16 mmol/L  6 (L)  7 (L)    eGFR      >60 mL/min/1.73 m^2  >60.0  >60.0    Specimen UA   Urine, Clean Catch    Color, UA      Yellow, Straw, Kathrin    Yellow    Appearance, UA      Clear    Clear    pH, UA      5.0 - 8.0    7.0    Specific Gravity, UA      1.005 - 1.030    1.010    Protein, UA      Negative    Negative    Glucose, UA      Negative    Negative    Ketones, UA      Negative    Negative    Bilirubin (UA)      Negative    Negative    Blood, UA      Negative    Negative    NITRITE UA      Negative    Negative    Leukocyte Esterase, UA      Negative    Negative    Cholesterol Total      120 - 199 mg/dL  180  208 (H)    Triglycerides      30 - 150 mg/dL  48  81    HDL      40 - 75 mg/dL  41  41    LDL Cholesterol      63.0 - 159.0 mg/dL  129.4  150.8    HDL/Cholesterol Ratio      20.0 - 50.0 %  22.8  19.7 (L)    Total Cholesterol/HDL Ratio      2.0 - 5.0   4.4  5.1 (H)    Non-HDL Cholesterol      mg/dL  139  167    Hemoglobin A1C External      4.0 - 5.6 % 5.5   5.7 (H)    Estimated Avg Glucose      68 - 131 mg/dL 111   117       Legend:  (L) Low  (H) High

## 2024-05-08 ENCOUNTER — OFFICE VISIT (OUTPATIENT)
Dept: FAMILY MEDICINE | Facility: CLINIC | Age: 49
End: 2024-05-08
Payer: COMMERCIAL

## 2024-05-08 VITALS
SYSTOLIC BLOOD PRESSURE: 138 MMHG | OXYGEN SATURATION: 97 % | BODY MASS INDEX: 36.45 KG/M2 | HEIGHT: 78 IN | HEART RATE: 87 BPM | TEMPERATURE: 99 F | WEIGHT: 315 LBS | DIASTOLIC BLOOD PRESSURE: 80 MMHG

## 2024-05-08 DIAGNOSIS — I10 ESSENTIAL HYPERTENSION: ICD-10-CM

## 2024-05-08 DIAGNOSIS — Z12.11 SCREENING FOR COLON CANCER: ICD-10-CM

## 2024-05-08 DIAGNOSIS — Z00.00 NORMAL PHYSICAL EXAM: Primary | ICD-10-CM

## 2024-05-08 DIAGNOSIS — R31.29 MICROHEMATURIA: ICD-10-CM

## 2024-05-08 DIAGNOSIS — D64.9 MILD CHRONIC ANEMIA: ICD-10-CM

## 2024-05-08 DIAGNOSIS — N52.9 MALE ERECTILE DISORDER: ICD-10-CM

## 2024-05-08 DIAGNOSIS — R73.09 ABNORMAL GLUCOSE: ICD-10-CM

## 2024-05-08 PROCEDURE — 99999 PR PBB SHADOW E&M-EST. PATIENT-LVL IV: CPT | Mod: PBBFAC,,, | Performed by: INTERNAL MEDICINE

## 2024-05-08 PROCEDURE — 99396 PREV VISIT EST AGE 40-64: CPT | Mod: S$GLB,,, | Performed by: INTERNAL MEDICINE

## 2024-05-08 RX ORDER — LOSARTAN POTASSIUM AND HYDROCHLOROTHIAZIDE 25; 100 MG/1; MG/1
1 TABLET ORAL DAILY
Qty: 90 TABLET | Refills: 3 | Status: SHIPPED | OUTPATIENT
Start: 2024-05-08 | End: 2025-05-08

## 2024-05-08 RX ORDER — TADALAFIL 5 MG/1
TABLET ORAL
Qty: 30 TABLET | Refills: 5 | Status: SHIPPED | OUTPATIENT
Start: 2024-05-08

## 2024-05-15 ENCOUNTER — CLINICAL SUPPORT (OUTPATIENT)
Dept: ENDOSCOPY | Facility: HOSPITAL | Age: 49
End: 2024-05-15
Attending: INTERNAL MEDICINE
Payer: COMMERCIAL

## 2024-05-15 DIAGNOSIS — Z12.11 SCREENING FOR COLON CANCER: ICD-10-CM

## 2024-05-15 NOTE — PLAN OF CARE
Contacted the patient to schedule an endoscopy procedure(s) colonoscopy. Spoke with patient. No current availability to schedule patient at this time. Will call patient when August schedule available. Patient verbalized understanding.

## 2024-05-29 ENCOUNTER — CLINICAL SUPPORT (OUTPATIENT)
Dept: FAMILY MEDICINE | Facility: CLINIC | Age: 49
End: 2024-05-29
Payer: COMMERCIAL

## 2024-05-29 VITALS — HEART RATE: 84 BPM | SYSTOLIC BLOOD PRESSURE: 126 MMHG | DIASTOLIC BLOOD PRESSURE: 82 MMHG

## 2024-05-29 DIAGNOSIS — I10 ESSENTIAL HYPERTENSION: Primary | ICD-10-CM

## 2024-05-29 PROCEDURE — 99999 PR PBB SHADOW E&M-EST. PATIENT-LVL I: CPT | Mod: PBBFAC,,,

## 2024-05-29 NOTE — PROGRESS NOTES
Tevin Velazquez 48 y.o. male is here today for Blood Pressure check.   History of HTN yes.    Review of patient's allergies indicates:  No Known Allergies  Creatinine   Date Value Ref Range Status   05/01/2024 1.1 0.5 - 1.4 mg/dL Final     Sodium   Date Value Ref Range Status   05/01/2024 139 136 - 145 mmol/L Final     Potassium   Date Value Ref Range Status   05/01/2024 4.1 3.5 - 5.1 mmol/L Final   ]  Patient verifies taking blood pressure medications on a regular basis at the same time of the day.     Current Outpatient Medications:     losartan-hydrochlorothiazide 100-25 mg (HYZAAR) 100-25 mg per tablet, Take 1 tablet by mouth once daily., Disp: 90 tablet, Rfl: 3    tadalafiL (CIALIS) 5 MG tablet, TAKE 1 TABLET(5 MG) BY MOUTH DAILY AS NEEDED FOR ERECTILE DYSFUNCTION, Disp: 30 tablet, Rfl: 5  Does patient have record of home blood pressure readings no.    Last dose of blood pressure medication was taken at 6:30 am.  Patient is asymptomatic.     BP: 126/82 , Pulse: 84 .    Dr. Nieves notified.

## 2024-07-26 ENCOUNTER — OFFICE VISIT (OUTPATIENT)
Dept: SPORTS MEDICINE | Facility: CLINIC | Age: 49
End: 2024-07-26
Payer: COMMERCIAL

## 2024-07-26 ENCOUNTER — HOSPITAL ENCOUNTER (OUTPATIENT)
Dept: RADIOLOGY | Facility: HOSPITAL | Age: 49
Discharge: HOME | End: 2024-07-26
Attending: ORTHOPAEDIC SURGERY
Payer: COMMERCIAL

## 2024-07-26 VITALS
HEIGHT: 78 IN | HEART RATE: 91 BPM | BODY MASS INDEX: 36.45 KG/M2 | SYSTOLIC BLOOD PRESSURE: 136 MMHG | DIASTOLIC BLOOD PRESSURE: 90 MMHG | WEIGHT: 315 LBS

## 2024-07-26 DIAGNOSIS — M17.11 PRIMARY OSTEOARTHRITIS OF RIGHT KNEE: Primary | ICD-10-CM

## 2024-07-26 DIAGNOSIS — M25.561 PAIN IN BOTH KNEES, UNSPECIFIED CHRONICITY: ICD-10-CM

## 2024-07-26 DIAGNOSIS — M25.562 PAIN IN BOTH KNEES, UNSPECIFIED CHRONICITY: ICD-10-CM

## 2024-07-26 PROCEDURE — 73564 X-RAY EXAM KNEE 4 OR MORE: CPT | Mod: TC,50

## 2024-07-26 PROCEDURE — 99999 PR PBB SHADOW E&M-EST. PATIENT-LVL III: CPT | Mod: PBBFAC,,, | Performed by: ORTHOPAEDIC SURGERY

## 2024-07-26 PROCEDURE — 73564 X-RAY EXAM KNEE 4 OR MORE: CPT | Mod: 26,50,, | Performed by: RADIOLOGY

## 2024-07-26 NOTE — PROGRESS NOTES
CC: Right knee pain    48 y.o. Male with a chronic history of right knee atraumatic pain.  Patient works for [employer].   He reports pain increased about a week ago while cutting grass. He has a history of knee injury while playing sports and a surgical history of knee arthroscopy. Pain and swelling noted along the medial joint line.     He reports that the pain and weakness. It also bothers him at night.    + mechanical symptoms, - instability    Is affecting ADLs.  Pain is 1/10 at it's worst.    REVIEW OF SYSTEMS:   Constitution: Negative. Negative for chills, fever and night sweats.   HENT: Negative for congestion and headaches.    Eyes: Negative for blurred vision, left vision loss and right vision loss.   Cardiovascular: Negative for chest pain and syncope.   Respiratory: Negative for cough and shortness of breath.    Endocrine: Negative for polydipsia, polyphagia and polyuria.   Hematologic/Lymphatic: Negative for bleeding problem. Does not bruise/bleed easily.   Skin: Negative for dry skin, itching and rash.   Musculoskeletal: Negative for falls. Positive for right knee pain and  muscle weakness.   Gastrointestinal: Negative for abdominal pain and bowel incontinence.   Genitourinary: Negative for bladder incontinence and nocturia.   Neurological: Negative for disturbances in coordination, loss of balance and seizures.   Psychiatric/Behavioral: Negative for depression. The patient does not have insomnia.    Allergic/Immunologic: Negative for hives and persistent infections.     PAST MEDICAL HISTORY:   Past Medical History:   Diagnosis Date    Hypertension        PAST SURGICAL HISTORY:   Past Surgical History:   Procedure Laterality Date    knee arthro Right     hx of meniscal tear       FAMILY HISTORY:   Family History   Problem Relation Name Age of Onset    Hypertension Mother      Ovarian cysts Mother      Glaucoma Mother      Hypertension Father      Melanoma Father      Diabetes Sister      Hypertension  Brother      Benign prostatic hyperplasia Brother      Gallbladder disease Brother      Thyroid disease Sister      No Known Problems Son      No Known Problems Son      Autism Son      No Known Problems Maternal Aunt      No Known Problems Maternal Uncle      No Known Problems Paternal Aunt      No Known Problems Paternal Uncle      No Known Problems Maternal Grandmother      No Known Problems Maternal Grandfather      No Known Problems Paternal Grandmother      No Known Problems Paternal Grandfather      Amblyopia Neg Hx      Blindness Neg Hx      Cancer Neg Hx      Cataracts Neg Hx      Macular degeneration Neg Hx      Retinal detachment Neg Hx      Strabismus Neg Hx      Stroke Neg Hx         SOCIAL HISTORY:   Social History     Socioeconomic History    Marital status:    Occupational History    Occupation: [employer] - ; formerly meter   Tobacco Use    Smoking status: Never    Smokeless tobacco: Never   Substance and Sexual Activity    Alcohol use: Yes     Alcohol/week: 0.0 standard drinks of alcohol     Comment: social    Drug use: No    Sexual activity: Yes     Partners: Female   Social History Narrative    [employer] graduate, he had played on the basketball team, and has worked offshore in safety and other capacities.       Social Determinants of Health     Food Insecurity: Unknown (12/15/2023)    Hunger Vital Sign     Ran Out of Food in the Last Year: Never true   Transportation Needs: No Transportation Needs (12/15/2023)    PRAPARE - Transportation     Lack of Transportation (Medical): No     Lack of Transportation (Non-Medical): No   Physical Activity: Unknown (12/15/2023)    Exercise Vital Sign     Days of Exercise per Week: 3 days   Stress: No Stress Concern Present (12/15/2023)    [employer] [place] [employer] - Occupational Stress Questionnaire     Feeling of Stress : Not at all   Housing Stability: Low Risk  (12/15/2023)    Housing Stability Vital Sign      "Unable to Pay for Housing in the Last Year: No     Number of Places Lived in the Last Year: 0     Unstable Housing in the Last Year: No       MEDICATIONS:     Current Outpatient Medications:     losartan-hydrochlorothiazide 100-25 mg (HYZAAR) 100-25 mg per tablet, Take 1 tablet by mouth once daily., Disp: 90 tablet, Rfl: 3    tadalafiL (CIALIS) 5 MG tablet, TAKE 1 TABLET(5 MG) BY MOUTH DAILY AS NEEDED FOR ERECTILE DYSFUNCTION, Disp: 30 tablet, Rfl: 5    ALLERGIES:   Review of patient's allergies indicates:  No Known Allergies    VITAL SIGNS:   BP (!) 136/90   Pulse 91   Ht 6' 6" (1.981 m)   Wt (!) 144.2 kg (317 lb 14.5 oz)   BMI 36.74 kg/m²      PHYSICAL EXAMINATION:  General:  The patient is alert and oriented x 3.  Mood is pleasant.  Observation of ears, eyes and nose reveal no gross abnormalities.  No labored breathing observed.    RIGHT KNEE EXAMINATION     OBSERVATION / INSPECTION   Gait:   Nonantalgic   Alignment:  Neutral   Scars:   None   Muscle atrophy: Mild  Effusion:  Mild  Warmth:  None   Discoloration:   none     TENDERNESS / CREPITUS (T / C):          T / C      T / C   Patella   - / -   Lateral joint line   - / -   Peripatellar medial  -  Medial joint line    + / -   Peripatellar lateral -  Medial plica   - / -   Patellar tendon -   Popliteal fossa   - / -   Quad tendon   -   Gastrocnemius   -   Prepatellar Bursa - / -   Quadricep   -   Tibial tubercle  -  Thigh/hamstring  -   Pes anserine/HS -  Fibula    -   ITB   - / -  Tibia     -   Tib/fib joint  - / -  LCL    -     MFC   - / -   MCL: Proximal  -    LFC   - / -    Distal   -          ROM: (* = pain)  PASSIVE   ACTIVE    Left :   5 / 0 / 145   5 / 0 / 145     Right :    5 / 0 / 145   5 / 0 / 145    Patellofemoral examination:  See above noted areas of tenderness.   Patella position    Subluxation / dislocation: Centered           Sup. / Inf;   Normal   Crepitus (PF):    Absent   Patellar " Mobility:       Medial-lateral:   Normal    Superior-inferior:  Normal    Inferior tilt   Normal    Patellar tendon:  Normal   Lateral tilt:    Normal   J-sign:     None   Patellofemoral grind:   No pain       MENISCAL SIGNS:     Pain on terminal extension:  -  Pain on terminal flexion:  -  Johns maneuver:  -   Squat     -     LIGAMENT EXAMINATION:  ACL / Lachman:  normal (-1 to 2mm)    PCL-Post.  drawer: normal 0 to 2mm  MCL- Valgus:  normal 0 to 2mm  LCL- Varus:  normal 0 to 2mm  Pivot shift: normal (Equal)   Dial Test: difference c/w other side   At 30° flexion: normal (< 5°)    At 90° flexion: normal (< 5°)   Reverse Pivot Shift:   normal (Equal)     STRENGTH: (* = with pain) PAINFUL SIDE   Quadricep   5/5   Hamstrin/5    EXTREMITY NEURO-VASCULAR EXAMINATION:   Sensation:  Grossly intact to light touch all dermatomal regions.   Motor Function:  Fully intact motor function at hip, knee, foot and ankle    DTRs;  quadriceps and  achilles 2+.  No clonus and downgoing Babinski.    Vascular status:  DP and PT pulses 2+, brisk capillary refill, symmetric.     OTHER FINDINGS:      IMAGING:    X-rays including standing, weight bearing AP and flexion bilateral knees, lateral and merchant views ordered and images reviewed by me show:    No fracture, dislocation or other pathology   Medial compartment: Moderate DJD    Lateral compartment: no degenerative changes   Patellofemoral compartment: moderate DJD         ASSESSMENT:    Right Knee Pain,   1. Primary osteoarthritis of right knee        PLAN:   1. HEP     2. F/u PRN       All questions were answered, pt will contact us for questions or concerns in the interim.

## 2024-09-06 DIAGNOSIS — N52.9 MALE ERECTILE DISORDER: ICD-10-CM

## 2024-09-06 NOTE — TELEPHONE ENCOUNTER
No care due was identified.  [facility] Embedded Care Due Messages. Reference number: [id number].   9/06/2024 3:00:10 PM CDT

## 2024-09-07 RX ORDER — TADALAFIL 5 MG/1
TABLET ORAL
Qty: 30 TABLET | Refills: 5 | Status: SHIPPED | OUTPATIENT
Start: 2024-09-07

## 2024-09-07 NOTE — TELEPHONE ENCOUNTER
Refill Decision Note   Tevin Velazquez  is requesting a refill authorization.  Brief Assessment and Rationale for Refill:  Approve     Medication Therapy Plan:  Order Hx has been qty 30 w/5RF; Patient is filling every month.      Comments:     Note composed:3:34 PM 09/07/2024

## 2024-09-24 ENCOUNTER — TELEPHONE (OUTPATIENT)
Dept: ENDOSCOPY | Facility: HOSPITAL | Age: 49
End: 2024-09-24
Payer: COMMERCIAL

## 2024-09-24 DIAGNOSIS — Z12.11 SCREEN FOR COLON CANCER: Primary | ICD-10-CM

## 2024-09-24 RX ORDER — SODIUM, POTASSIUM,MAG SULFATES 17.5-3.13G
1 SOLUTION, RECONSTITUTED, ORAL ORAL DAILY
Qty: 1 KIT | Refills: 0 | Status: CANCELLED | OUTPATIENT
Start: 2024-09-24 | End: 2024-09-26

## 2024-09-24 RX ORDER — SODIUM, POTASSIUM,MAG SULFATES 17.5-3.13G
1 SOLUTION, RECONSTITUTED, ORAL ORAL DAILY
Qty: 1 KIT | Refills: 0 | Status: SHIPPED | OUTPATIENT
Start: 2024-09-24 | End: 2024-09-26

## 2024-09-24 NOTE — TELEPHONE ENCOUNTER
Spoke to pt to schedule procedure(s) Colonoscopy       Physician to perform procedure(s) Dr. ROMEO Vizcarra  Date of Procedure (s) 12/2/2024  Arrival Time 8:45 AM  Time of Procedure(s) 9:45 AM   Location of Procedure(s) [address]   Type of Rx Prep sent to patient: Suprep  Instructions provided to patient via [facility]    Patient was informed on the following information and verbalized understanding. Screening questionnaire reviewed with patient and complete. If procedure requires anesthesia, a responsible adult needs to be present to accompany the patient home, patient cannot drive after receiving anesthesia. Appointment details are tentative, especially check-in time. Patient will receive a prep-op call 7 days prior to confirm check-in time for procedure. If applicable the patient should contact their pharmacy to verify Rx for procedure prep is ready for pick-up. Patient was advised to call the scheduling department at [phone] if pharmacy states no Rx is available. Patient was advised to call the endoscopy scheduling department if any questions or concerns arise.      [facility] Scheduling Department

## 2024-11-29 ENCOUNTER — ANESTHESIA EVENT (OUTPATIENT)
Dept: ENDOSCOPY | Facility: HOSPITAL | Age: 49
End: 2024-11-29
Payer: COMMERCIAL

## 2024-11-29 RX ORDER — SODIUM CHLORIDE 9 MG/ML
INJECTION, SOLUTION INTRAVENOUS CONTINUOUS
OUTPATIENT
Start: 2024-11-29

## 2024-11-29 RX ORDER — LIDOCAINE HYDROCHLORIDE 10 MG/ML
1 INJECTION, SOLUTION EPIDURAL; INFILTRATION; INTRACAUDAL; PERINEURAL ONCE
OUTPATIENT
Start: 2024-11-29 | End: 2024-11-29

## 2024-12-02 ENCOUNTER — HOSPITAL ENCOUNTER (OUTPATIENT)
Facility: HOSPITAL | Age: 49
Discharge: HOME | End: 2024-12-02
Attending: STUDENT IN AN ORGANIZED HEALTH CARE EDUCATION/TRAINING PROGRAM | Admitting: STUDENT IN AN ORGANIZED HEALTH CARE EDUCATION/TRAINING PROGRAM
Payer: COMMERCIAL

## 2024-12-02 ENCOUNTER — ANESTHESIA (OUTPATIENT)
Dept: ENDOSCOPY | Facility: HOSPITAL | Age: 49
End: 2024-12-02
Payer: COMMERCIAL

## 2024-12-02 VITALS
SYSTOLIC BLOOD PRESSURE: 131 MMHG | RESPIRATION RATE: 12 BRPM | TEMPERATURE: 98 F | HEART RATE: 74 BPM | OXYGEN SATURATION: 100 % | DIASTOLIC BLOOD PRESSURE: 83 MMHG

## 2024-12-02 DIAGNOSIS — Z12.11 COLON CANCER SCREENING: Primary | ICD-10-CM

## 2024-12-02 PROCEDURE — 45385 COLONOSCOPY W/LESION REMOVAL: CPT | Mod: 33,,, | Performed by: STUDENT IN AN ORGANIZED HEALTH CARE EDUCATION/TRAINING PROGRAM

## 2024-12-02 PROCEDURE — 27201089 HC SNARE, DISP (ANY): Performed by: STUDENT IN AN ORGANIZED HEALTH CARE EDUCATION/TRAINING PROGRAM

## 2024-12-02 PROCEDURE — 37000009 HC ANESTHESIA EA ADD 15 MINS: Performed by: STUDENT IN AN ORGANIZED HEALTH CARE EDUCATION/TRAINING PROGRAM

## 2024-12-02 PROCEDURE — 25000003 PHARM REV CODE 250: Performed by: STUDENT IN AN ORGANIZED HEALTH CARE EDUCATION/TRAINING PROGRAM

## 2024-12-02 PROCEDURE — 37000008 HC ANESTHESIA 1ST 15 MINUTES: Performed by: STUDENT IN AN ORGANIZED HEALTH CARE EDUCATION/TRAINING PROGRAM

## 2024-12-02 PROCEDURE — D9220A PRA ANESTHESIA: Mod: ,,, | Performed by: STUDENT IN AN ORGANIZED HEALTH CARE EDUCATION/TRAINING PROGRAM

## 2024-12-02 PROCEDURE — 63600175 PHARM REV CODE 636 W HCPCS: Performed by: STUDENT IN AN ORGANIZED HEALTH CARE EDUCATION/TRAINING PROGRAM

## 2024-12-02 PROCEDURE — 45385 COLONOSCOPY W/LESION REMOVAL: CPT | Mod: 33 | Performed by: STUDENT IN AN ORGANIZED HEALTH CARE EDUCATION/TRAINING PROGRAM

## 2024-12-02 PROCEDURE — 88305 TISSUE EXAM BY PATHOLOGIST: CPT | Performed by: PATHOLOGY

## 2024-12-02 RX ORDER — PHENYLEPHRINE HCL IN 0.9% NACL 1 MG/10 ML
SYRINGE (ML) INTRAVENOUS
Status: DISCONTINUED
Start: 2024-12-02 | End: 2024-12-02 | Stop reason: HOSPADM

## 2024-12-02 RX ORDER — PROPOFOL 10 MG/ML
VIAL (ML) INTRAVENOUS
Status: DISCONTINUED | OUTPATIENT
Start: 2024-12-02 | End: 2024-12-02

## 2024-12-02 RX ORDER — PHENYLEPHRINE HYDROCHLORIDE 10 MG/ML
INJECTION INTRAVENOUS
Status: DISCONTINUED | OUTPATIENT
Start: 2024-12-02 | End: 2024-12-02

## 2024-12-02 RX ORDER — KETAMINE HYDROCHLORIDE 100 MG/ML
INJECTION, SOLUTION INTRAMUSCULAR; INTRAVENOUS
Status: COMPLETED
Start: 2024-12-02 | End: 2024-12-02

## 2024-12-02 RX ORDER — KETAMINE HYDROCHLORIDE 100 MG/ML
INJECTION, SOLUTION INTRAMUSCULAR; INTRAVENOUS
Status: DISCONTINUED | OUTPATIENT
Start: 2024-12-02 | End: 2024-12-02

## 2024-12-02 RX ORDER — LIDOCAINE HYDROCHLORIDE 20 MG/ML
INJECTION INTRAVENOUS
Status: DISCONTINUED | OUTPATIENT
Start: 2024-12-02 | End: 2024-12-02

## 2024-12-02 RX ORDER — PROPOFOL 10 MG/ML
VIAL (ML) INTRAVENOUS
Status: DISCONTINUED
Start: 2024-12-02 | End: 2024-12-02 | Stop reason: HOSPADM

## 2024-12-02 RX ORDER — LIDOCAINE HYDROCHLORIDE 20 MG/ML
INJECTION, SOLUTION EPIDURAL; INFILTRATION; INTRACAUDAL; PERINEURAL
Status: DISCONTINUED
Start: 2024-12-02 | End: 2024-12-02 | Stop reason: HOSPADM

## 2024-12-02 RX ADMIN — PROPOFOL 100 MG: 10 INJECTION, EMULSION INTRAVENOUS at 10:12

## 2024-12-02 RX ADMIN — PROPOFOL 20 MG: 10 INJECTION, EMULSION INTRAVENOUS at 10:12

## 2024-12-02 RX ADMIN — PHENYLEPHRINE HYDROCHLORIDE 200 MCG: 10 INJECTION INTRAVENOUS at 10:12

## 2024-12-02 RX ADMIN — LIDOCAINE HYDROCHLORIDE 100 MG: 20 INJECTION, SOLUTION INTRAVENOUS at 10:12

## 2024-12-02 RX ADMIN — SODIUM CHLORIDE: 0.9 INJECTION, SOLUTION INTRAVENOUS at 10:12

## 2024-12-02 RX ADMIN — PROPOFOL 30 MG: 10 INJECTION, EMULSION INTRAVENOUS at 10:12

## 2024-12-02 RX ADMIN — KETAMINE HYDROCHLORIDE 30 MG: 100 INJECTION, SOLUTION, CONCENTRATE INTRAMUSCULAR; INTRAVENOUS at 10:12

## 2024-12-02 NOTE — PROVATION PATIENT INSTRUCTIONS
Discharge Summary/Instructions after an Endoscopic Procedure  Patient Name: Tevin Velazquez  Patient MRN: [id number]  Patient YOB: 1975  Monday, December 2, 2024  Suzanna Vizcarra MD  Dear patient,  As a result of recent federal legislation (The Federal Cures Act), you may   receive lab or pathology results from your procedure in your [facility]   account before your physician is able to contact you. Your physician or   their representative will relay the results to you with their   recommendations at their soonest availability.  Thank you,  RESTRICTIONS:  During your procedure today, you received medications for sedation.  These   medications may affect your judgment, balance and coordination.  Therefore,   for 24 hours, you have the following restrictions:   - DO NOT drive a car, operate machinery, make legal/financial decisions,   sign important papers or drink alcohol.    ACTIVITY:  Today: no heavy lifting, straining or running due to procedural   sedation/anesthesia.  The following day: return to full activity including work.  DIET:  Eat and drink normally unless instructed otherwise.     TREATMENT FOR COMMON SIDE EFFECTS:  - Mild abdominal pain, nausea, belching, bloating or excessive gas:  rest,   eat lightly and use a heating pad.  - Sore Throat: treat with throat lozenges and/or gargle with warm salt   water.  - Because air was used during the procedure, expelling large amounts of air   from your rectum or belching is normal.  - If a bowel prep was taken, you may not have a bowel movement for 1-3 days.    This is normal.  SYMPTOMS TO WATCH FOR AND REPORT TO YOUR PHYSICIAN:  1. Abdominal pain or bloating, other than gas cramps.  2. Chest pain.  3. Back pain.  4. Signs of infection such as: chills or fever occurring within 24 hours   after the procedure.  5. Rectal bleeding, which would show as bright red, maroon, or black stools.   (A tablespoon of blood from the rectum is not serious, especially  if   hemorrhoids are present.)  6. Vomiting.  7. Weakness or dizziness.  GO DIRECTLY TO THE NEAREST EMERGENCY ROOM IF YOU HAVE ANY OF THE FOLLOWING:      Difficulty breathing              Chills and/or fever over 101 F   Persistent vomiting and/or vomiting blood   Severe abdominal pain   Severe chest pain   Black, tarry stools   Bleeding- more than one tablespoon   Any other symptom or condition that you feel may need urgent attention  Your doctor recommends these additional instructions:  If any biopsies were taken, your doctors clinic will contact you in 1 to 2   weeks with any results.  - Patient has a contact number available for emergencies.  The signs and   symptoms of potential delayed complications were discussed with the   patient.  Return to normal activities tomorrow.  Written discharge   instructions were provided to the patient.   - Discharge patient to home.   - Resume previous diet.   - Continue present medications.   - Await pathology results.   - Repeat colonoscopy in 10 years for surveillance.  For questions, problems or results please call your physician - Suzanna Vizcarra MD at Work:  [phone].  [facility] can be reached at [phone]     IF A COMPLICATION OR EMERGENCY SITUATION ARISES AND YOU ARE UNABLE TO REACH   YOUR PHYSICIAN - GO DIRECTLY TO THE EMERGENCY ROOM.  MD Szuanna Gonzales MD  12/2/2024 11:03:16 AM  This report has been verified and signed electronically.  Dear patient,  As a result of recent federal legislation (The Federal Cures Act), you may   receive lab or pathology results from your procedure in your [facility]   account before your physician is able to contact you. Your physician or   their representative will relay the results to you with their   recommendations at their soonest availability.  Thank you,  [employer]ATION

## 2024-12-02 NOTE — TRANSFER OF CARE
Anesthesia Transfer of Care Note    Patient: Tevin Velazquez    Procedure(s) Performed: Procedure(s) (LRB):  COLONOSCOPY (N/A)    Patient location: PACU    Anesthesia Type: general    Transport from OR: Transported from OR on room air with adequate spontaneous ventilation    Post pain: adequate analgesia    Post assessment: no apparent anesthetic complications and tolerated procedure well    Post vital signs: stable    Level of consciousness: awake and alert    Nausea/Vomiting: no nausea/vomiting    Complications: none    Transfer of care protocol was followed      Last vitals: Visit Vitals  BP (!) 116/70   Pulse 75   Temp 36.5°C (97.7°F)   Resp 16   SpO2 98%

## 2024-12-02 NOTE — H&P
Short Stay Endoscopy History and Physical    PCP - Demarcus Nieves MD    Procedure - Colonoscopy  ASA - per anesthesia  Mallampati - per anesthesia  History of Anesthesia problems - no  Family history Anesthesia problems -  no   Plan of anesthesia - General    HPI:  This is a 49 y.o. male here for evaluation of : CRC screening. Patient had a colonoscopy at age 40 for change in bowel habits - no polyps.     ROS:  Constitutional: No fevers, chills  CV: No chest pain  Pulm: No shortness of breath  GI: see HPI  Derm: No rash    Medical History:  has a past medical history of Hypertension.    Surgical History:  has a past surgical history that includes knee arthro (Right).    Family History: family history includes Autism in his son; Benign prostatic hyperplasia in his brother; Diabetes in his sister; Gallbladder disease in his brother; Glaucoma in his mother; Hypertension in his brother, father, and mother; Melanoma in his father; No Known Problems in his maternal aunt, maternal grandfather, maternal grandmother, maternal uncle, paternal aunt, paternal grandfather, paternal grandmother, paternal uncle, son, and son; Ovarian cysts in his mother; Thyroid disease in his sister.. Otherwise no colon cancer, inflammatory bowel disease, or GI malignancies.    Social History:  reports that he has never smoked. He has never used smokeless tobacco. He reports current alcohol use. He reports that he does not use drugs.    Review of patient's allergies indicates:  No Known Allergies    Medications:   Medications Prior to Admission   Medication Sig Dispense Refill Last Dose/Taking    losartan-hydrochlorothiazide 100-25 mg (HYZAAR) 100-25 mg per tablet Take 1 tablet by mouth once daily. 90 tablet 3     tadalafiL (CIALIS) 5 MG tablet TAKE 1 TABLET(5 MG) BY MOUTH DAILY AS NEEDED FOR ERECTILE DYSFUNCTION 30 tablet 5          Physical Exam:    Vital Signs: There were no vitals filed for this visit.    General Appearance: Well  appearing in no acute distress  Eyes:    No scleral icterus  ENT: lips and tongue normal  Lungs: no use of accessory muscles  Heart:  normal rate, regular rhythm  Abdomen: Soft, non tender, non distended   Extremities: no edema  Skin: No rash      Labs:  Lab Results   Component Value Date    WBC 4.03 05/01/2024    HGB 14.4 05/01/2024    HCT 46.0 05/01/2024     05/01/2024    CHOL 208 (H) 05/01/2024    TRIG 81 05/01/2024    HDL 41 05/01/2024    ALT 14 05/01/2024    AST 16 05/01/2024     05/01/2024    K 4.1 05/01/2024     05/01/2024    CREATININE 1.1 05/01/2024    BUN 8 05/01/2024    CO2 28 05/01/2024    TSH 1.113 04/30/2022    PSA 0.66 03/28/2016    HGBA1C 5.7 (H) 05/01/2024       I have explained the risks and benefits of endoscopy procedures to the patient including but not limited to bleeding, perforation, infection, and death.  The patient was asked if they understand and allowed to ask any further questions to their satisfaction.    Joel Diaz MD

## 2024-12-02 NOTE — PLAN OF CARE
Procedure and recovery complete. Awake and alert. No c/o pain or discomfort. Discharge instructions given. Verbalized understanding. No acute distress noted.

## 2024-12-02 NOTE — ANESTHESIA PREPROCEDURE EVALUATION
12/02/2024    Pre-operative evaluation for Procedure(s) (LRB):  COLONOSCOPY (N/A)    Tevin Velazquez is a 49 y.o. male     Patient Active Problem List   Diagnosis    Essential hypertension    Family history of diabetes mellitus    Metatarsalgia    Vitiligo    Microhematuria ever since his 20s.    Mild chronic anemia    Screening for colon cancer 5/6/2015 colonoscopy normal    Abnormal glucose       Review of patient's allergies indicates:  No Known Allergies    No current facility-administered medications on file prior to encounter.     Current Outpatient Medications on File Prior to Encounter   Medication Sig Dispense Refill    losartan-hydrochlorothiazide 100-25 mg (HYZAAR) 100-25 mg per tablet Take 1 tablet by mouth once daily. 90 tablet 3    tadalafiL (CIALIS) 5 MG tablet TAKE 1 TABLET(5 MG) BY MOUTH DAILY AS NEEDED FOR ERECTILE DYSFUNCTION 30 tablet 5       Past Surgical History:   Procedure Laterality Date    knee arthro Right     hx of meniscal tear         Pre-op Assessment    I have reviewed the Patient Summary Reports.    I have reviewed the NPO Status.   I have reviewed the Medications.     Review of Systems  Anesthesia Hx:  No problems with previous Anesthesia   History of prior surgery of interest to airway management or planning:          Denies Family Hx of Anesthesia complications.    Denies Personal Hx of Anesthesia complications.                    Social:  Non-Smoker, No Alcohol Use       Hematology/Oncology:    Oncology Normal    -- Anemia:                                  EENT/Dental:  EENT/Dental Normal           Cardiovascular:  Exercise tolerance: good   Hypertension, well controlled                                    Hypertension         Pulmonary:  Pulmonary Normal                       Renal/:  Renal/ Normal                 Hepatic/GI:  Hepatic/GI Normal                     Musculoskeletal:  Musculoskeletal Normal                Neurological:  Neurology Normal                                      Endocrine:        Obesity / BMI > 30  Dermatological:  Skin Normal    Psych:  Psychiatric Normal                    Physical Exam  General: Well nourished, Cooperative, Alert and Oriented    Airway:  Mallampati: II   Mouth Opening: Normal  TM Distance: Normal  Tongue: Normal  Neck ROM: Normal ROM    Dental:  Intact    Chest/Lungs:  Normal Respiratory Rate    Heart:  Rate: Normal  Rhythm: Regular Rhythm  Sounds: Normal        Anesthesia Plan  Type of Anesthesia, risks & benefits discussed:    Anesthesia Type: Gen Natural Airway  Intra-op Monitoring Plan: Standard ASA Monitors  Induction:  IV  Informed Consent: Informed consent signed with the Patient and all parties understand the risks and agree with anesthesia plan.  All questions answered.   ASA Score: 2    Ready For Surgery From Anesthesia Perspective.     .

## 2024-12-02 NOTE — ANESTHESIA POSTPROCEDURE EVALUATION
Anesthesia Post Evaluation    Patient: Tevin Velazquez    Procedure(s) Performed: Procedure(s) (LRB):  COLONOSCOPY (N/A)    Final Anesthesia Type: general      Patient location during evaluation: GI PACU  Patient participation: Yes- Able to Participate  Level of consciousness: awake and alert and oriented  Post-procedure vital signs: reviewed and stable  Pain management: adequate  Airway patency: patent    PONV status at discharge: No PONV  Anesthetic complications: no      Cardiovascular status: blood pressure returned to baseline and hemodynamically stable  Respiratory status: unassisted, spontaneous ventilation and room air  Hydration status: euvolemic  Follow-up not needed.              Vitals Value Taken Time   /83 12/02/24 1128   Temp 36.4 °C (97.6 °F) 12/02/24 1058   Pulse 74 12/02/24 1128   Resp 12 12/02/24 1128   SpO2 100 % 12/02/24 1128         Event Time   Out of Recovery 11:46:32         Pain/Ben Score: Ben Score: 10 (12/2/2024 11:28 AM)

## 2024-12-03 LAB
FINAL PATHOLOGIC DIAGNOSIS: NORMAL
GROSS: NORMAL
Lab: NORMAL

## 2025-03-08 DIAGNOSIS — N52.9 MALE ERECTILE DISORDER: ICD-10-CM

## 2025-03-08 NOTE — TELEPHONE ENCOUNTER
Care Due:                  Date            Visit Type   Department     Provider  --------------------------------------------------------------------------------                                [facility]                              PRIMARY      MED/ INTERNAL  Last Visit: 05-      CARE ([facility])   MED/ PEDS      Demarcus Nieves  Next Visit: None Scheduled  None         None Found                                                            Last  Test          Frequency    Reason                     Performed    Due Date  --------------------------------------------------------------------------------    CMP.........  12 months..  losartan-hydrochlorothiaz  05- 04-                             joselin......................    [employer] Embedded Care Due Messages. Reference number: [id number].   3/08/2025 12:36:06 PM CST

## 2025-03-09 RX ORDER — TADALAFIL 5 MG/1
TABLET ORAL
Qty: 30 TABLET | Refills: 2 | Status: SHIPPED | OUTPATIENT
Start: 2025-03-09

## 2025-03-10 NOTE — TELEPHONE ENCOUNTER
Provider Staff:  Action required for this patient     Please see care gap opportunities below in Care Due Message.    Thanks!  [facility]     Appointments      Date Provider   Last Visit   5/8/2024 Demarcus Nieves MD   Next Visit   Visit date not found Demarcus Nieves MD      Refill Decision Note   Emoryusha Velazquez  is requesting a refill authorization.  Brief Assessment and Rationale for Refill:  Approve     Medication Therapy Plan:         Comments:     Note composed:8:10 PM 03/09/2025

## 2025-05-01 ENCOUNTER — LAB VISIT (OUTPATIENT)
Dept: LAB | Facility: HOSPITAL | Age: 50
End: 2025-05-01
Attending: INTERNAL MEDICINE
Payer: COMMERCIAL

## 2025-05-01 DIAGNOSIS — Z00.00 NORMAL PHYSICAL EXAM: ICD-10-CM

## 2025-05-01 LAB
ALBUMIN SERPL BCP-MCNC: 3.9 G/DL (ref 3.5–5.2)
ALP SERPL-CCNC: 75 UNIT/L (ref 40–150)
ALT SERPL W/O P-5'-P-CCNC: 14 UNIT/L (ref 10–44)
ANION GAP (OHS): 9 MMOL/L (ref 8–16)
AST SERPL-CCNC: 16 UNIT/L (ref 11–45)
BILIRUB SERPL-MCNC: 1.2 MG/DL (ref 0.1–1)
BUN SERPL-MCNC: 9 MG/DL (ref 6–20)
CALCIUM SERPL-MCNC: 9.3 MG/DL (ref 8.7–10.5)
CHLORIDE SERPL-SCNC: 104 MMOL/L (ref 95–110)
CHOLEST SERPL-MCNC: 212 MG/DL (ref 120–199)
CHOLEST/HDLC SERPL: 5 {RATIO} (ref 2–5)
CO2 SERPL-SCNC: 25 MMOL/L (ref 23–29)
CREAT SERPL-MCNC: 1.1 MG/DL (ref 0.5–1.4)
EAG (OHS): 120 MG/DL (ref 68–131)
ERYTHROCYTE [DISTWIDTH] IN BLOOD BY AUTOMATED COUNT: 14.5 % (ref 11.5–14.5)
GFR SERPLBLD CREATININE-BSD FMLA CKD-EPI: >60 ML/MIN/1.73/M2
GLUCOSE SERPL-MCNC: 101 MG/DL (ref 70–110)
HBA1C MFR BLD: 5.8 % (ref 4–5.6)
HCT VFR BLD AUTO: 41.1 % (ref 40–54)
HDLC SERPL-MCNC: 42 MG/DL (ref 40–75)
HDLC SERPL: 19.8 % (ref 20–50)
HGB BLD-MCNC: 12.6 GM/DL (ref 14–18)
LDLC SERPL CALC-MCNC: 153 MG/DL (ref 63–159)
MCH RBC QN AUTO: 26.5 PG (ref 27–31)
MCHC RBC AUTO-ENTMCNC: 30.7 G/DL (ref 32–36)
MCV RBC AUTO: 86 FL (ref 82–98)
NONHDLC SERPL-MCNC: 170 MG/DL
PLATELET # BLD AUTO: 281 K/UL (ref 150–450)
PMV BLD AUTO: 9.9 FL (ref 9.2–12.9)
POTASSIUM SERPL-SCNC: 4.3 MMOL/L (ref 3.5–5.1)
PROT SERPL-MCNC: 7.1 GM/DL (ref 6–8.4)
RBC # BLD AUTO: 4.76 M/UL (ref 4.6–6.2)
SODIUM SERPL-SCNC: 138 MMOL/L (ref 136–145)
TRIGL SERPL-MCNC: 85 MG/DL (ref 30–150)
WBC # BLD AUTO: 5.2 K/UL (ref 3.9–12.7)

## 2025-05-01 PROCEDURE — 84295 ASSAY OF SERUM SODIUM: CPT

## 2025-05-01 PROCEDURE — 83036 HEMOGLOBIN GLYCOSYLATED A1C: CPT

## 2025-05-01 PROCEDURE — 36415 COLL VENOUS BLD VENIPUNCTURE: CPT | Mod: PO

## 2025-05-01 PROCEDURE — 85027 COMPLETE CBC AUTOMATED: CPT

## 2025-05-01 PROCEDURE — 82465 ASSAY BLD/SERUM CHOLESTEROL: CPT

## 2025-05-02 ENCOUNTER — RESULTS FOLLOW-UP (OUTPATIENT)
Dept: FAMILY MEDICINE | Facility: CLINIC | Age: 50
End: 2025-05-02

## 2025-05-02 DIAGNOSIS — D12.6 TUBULAR ADENOMA OF COLON: Primary | ICD-10-CM

## 2025-05-08 DIAGNOSIS — I10 ESSENTIAL HYPERTENSION: ICD-10-CM

## 2025-05-08 RX ORDER — LOSARTAN POTASSIUM AND HYDROCHLOROTHIAZIDE 25; 100 MG/1; MG/1
1 TABLET ORAL
Qty: 90 TABLET | Refills: 0 | Status: SHIPPED | OUTPATIENT
Start: 2025-05-08 | End: 2025-05-09 | Stop reason: SDUPTHER

## 2025-05-08 NOTE — TELEPHONE ENCOUNTER
Provider Staff:  Action required for this patient    Requires appointment      Please see care gap opportunities below in Care Due Message.    Thanks!  [facility]     Appointments      Date Provider   Last Visit   5/8/2024 Demarcus Nieves MD   Next Visit   Visit date not found Demarcus Nieves MD     Refill Decision Note   Tevin Velazquez  is requesting a refill authorization.  Brief Assessment and Rationale for Refill:  Approve     Medication Therapy Plan:        Comments:     Note composed:8:47 AM 05/08/2025

## 2025-05-08 NOTE — TELEPHONE ENCOUNTER
Care Due:                  Date            Visit Type   Department     Provider  --------------------------------------------------------------------------------                                [facility] Sty[facility]         [facility]                              PRIMARY      / INTERNAL MED  Last Visit: 05-      CARE ([facility])   / KATHI Nieves  Next Visit: None Scheduled  None         None Found                                                            Last  Test          Frequency    Reason                     Performed    Due Date  --------------------------------------------------------------------------------    Office Visit  15 months..  losartan-hydrochlorothiaz  05- 08-                             joselin, tadalafiL...........    [employer] Embedded Care Due Messages. Reference number: [id number].   5/08/2025 8:20:57 AM CDT

## 2025-05-09 ENCOUNTER — OFFICE VISIT (OUTPATIENT)
Dept: FAMILY MEDICINE | Facility: CLINIC | Age: 50
End: 2025-05-09
Payer: COMMERCIAL

## 2025-05-09 VITALS
WEIGHT: 315 LBS | HEART RATE: 98 BPM | BODY MASS INDEX: 36.45 KG/M2 | DIASTOLIC BLOOD PRESSURE: 70 MMHG | HEIGHT: 78 IN | OXYGEN SATURATION: 97 % | SYSTOLIC BLOOD PRESSURE: 136 MMHG | TEMPERATURE: 99 F

## 2025-05-09 DIAGNOSIS — D64.9 ANEMIA, UNSPECIFIED TYPE: ICD-10-CM

## 2025-05-09 DIAGNOSIS — R73.09 ABNORMAL GLUCOSE: ICD-10-CM

## 2025-05-09 DIAGNOSIS — D12.6 TUBULAR ADENOMA OF COLON: ICD-10-CM

## 2025-05-09 DIAGNOSIS — N52.9 MALE ERECTILE DISORDER: ICD-10-CM

## 2025-05-09 DIAGNOSIS — M17.11 PRIMARY OSTEOARTHRITIS OF RIGHT KNEE: ICD-10-CM

## 2025-05-09 DIAGNOSIS — Z83.3 FAMILY HISTORY OF DIABETES MELLITUS: ICD-10-CM

## 2025-05-09 DIAGNOSIS — Z00.00 NORMAL PHYSICAL EXAM: Primary | ICD-10-CM

## 2025-05-09 DIAGNOSIS — R31.29 MICROHEMATURIA: ICD-10-CM

## 2025-05-09 DIAGNOSIS — I10 ESSENTIAL HYPERTENSION: ICD-10-CM

## 2025-05-09 PROCEDURE — 99999 PR PBB SHADOW E&M-EST. PATIENT-LVL IV: CPT | Mod: PBBFAC,,, | Performed by: INTERNAL MEDICINE

## 2025-05-09 RX ORDER — LOSARTAN POTASSIUM AND HYDROCHLOROTHIAZIDE 25; 100 MG/1; MG/1
1 TABLET ORAL DAILY
Qty: 90 TABLET | Refills: 3 | Status: SHIPPED | OUTPATIENT
Start: 2025-05-09

## 2025-05-09 RX ORDER — DICLOFENAC SODIUM 10 MG/G
4 GEL TOPICAL 4 TIMES DAILY
Qty: 100 G | Refills: 11 | Status: SHIPPED | OUTPATIENT
Start: 2025-05-09

## 2025-05-09 RX ORDER — TADALAFIL 5 MG/1
TABLET ORAL
Qty: 30 TABLET | Refills: 5 | Status: SHIPPED | OUTPATIENT
Start: 2025-05-09

## 2025-05-09 NOTE — ASSESSMENT & PLAN NOTE
05/09/2025:  Pre visit labs reviewed   Colonoscopy 2024 repeat 2031  PSA next year  Needs more physical activity, limited by his knee  Orders:    CBC Without Differential; Future    Ferritin; Future    Iron and TIBC; Future    Reticulocytes; Future    Microalbumin/Creatinine Ratio, Urine; Future    CBC Without Differential; Future    Comprehensive Metabolic Panel; Future    Lipid Panel; Future    Hemoglobin A1C; Future    PSA, Screening; Future    Urinalysis; Future

## 2025-05-09 NOTE — ASSESSMENT & PLAN NOTE
05/09/2025: Pre visit labs A1c stable pre diabetes range.  Self assessment needs more physical activity.

## 2025-05-09 NOTE — ASSESSMENT & PLAN NOTE
05/09/2025: Most recent urinalysis no blood.  Check future surveillance.  If hematuria significantly worsens may have him re-evaluated with Urology.  Orders:    Microalbumin/Creatinine Ratio, Urine; Future    Urinalysis; Future

## 2025-05-09 NOTE — PROGRESS NOTES
This note was created by combination of typed  and M-Modal dictation.  Transcription errors may be present.   This note was also generated with the assistance of ambient listening technology. Verbal consent was obtained by the patient and accompanying visitor(s) for the recording of patient appointment to facilitate this note. I attest to having reviewed and edited the generated note for accuracy, though some syntax or spelling errors may persist. Please contact the author of this note for any clarification.    Assessment and Plan:   Assessment and Plan   Assessment & Plan  Normal physical exam  Tubular adenoma of colon 12/2/24 colonoscopy sigmoid TA. Melanosis coli repeat 7-10 years  05/09/2025:  Pre visit labs reviewed   Colonoscopy 2024 repeat 2031  PSA next year  Needs more physical activity, limited by his knee  Orders:    CBC Without Differential; Future    Ferritin; Future    Iron and TIBC; Future    Reticulocytes; Future    Microalbumin/Creatinine Ratio, Urine; Future    CBC Without Differential; Future    Comprehensive Metabolic Panel; Future    Lipid Panel; Future    Hemoglobin A1C; Future    PSA, Screening; Future    Urinalysis; Future    Anemia, unspecified type  05/09/2025:  New.  Not sure if this is lab variation or if this is real.  Up-to-date colonoscopy.  No obvious blood loss.  Check future labs.  If confirms iron-deficiency, needs further evaluation  Orders:    CBC Without Differential; Future    Ferritin; Future    Iron and TIBC; Future    Reticulocytes; Future    Essential hypertension  05/09/2025: Reports home readings systolic readings in the 130 range.  On losartan HCT without issues.  Refilled  Orders:    losartan-hydrochlorothiazide 100-25 mg (HYZAAR) 100-25 mg per tablet; Take 1 tablet by mouth once daily.    Abnormal glucose  Family history of diabetes mellitus  05/09/2025: Pre visit labs A1c stable pre diabetes range.  Self assessment needs more physical activity.        Microhematuria ever since his 20s.  05/09/2025: Most recent urinalysis no blood.  Check future surveillance.  If hematuria significantly worsens may have him re-evaluated with Urology.  Orders:    Microalbumin/Creatinine Ratio, Urine; Future    Urinalysis; Future    Primary osteoarthritis of right knee  05/09/2025: Finds it bothersome.  Takes a leave in the mornings.  Has never tried diclofenac, recommended trial of topical diclofenac  Orders:    diclofenac sodium (VOLTAREN ARTHRITIS PAIN) 1 % Gel; Apply 4 g topically 4 (four) times daily.    Male erectile disorder  05/09/2025: Takes Cialis p.r.n..  Finds it helpful.  No issues.  Refilled  Orders:    tadalafiL (CIALIS) 5 MG tablet; TAKE 1 TABLET(5 MG) BY MOUTH DAILY AS NEEDED FOR ERECTILE DYSFUNCTION      Medications Discontinued During This Encounter   Medication Reason    tadalafiL (CIALIS) 5 MG tablet Reorder    losartan-hydrochlorothiazide 100-25 mg (HYZAAR) 100-25 mg per tablet Reorder       meds sent this encounter:     Medications Ordered This Encounter   Medications    diclofenac sodium (VOLTAREN ARTHRITIS PAIN) 1 % Gel     Sig: Apply 4 g topically 4 (four) times daily.     Dispense:  100 g     Refill:  11    losartan-hydrochlorothiazide 100-25 mg (HYZAAR) 100-25 mg per tablet     Sig: Take 1 tablet by mouth once daily.     Dispense:  90 tablet     Refill:  3     Pharmacy update refills, keep on file, not requesting Rx to be filled today.    tadalafiL (CIALIS) 5 MG tablet     Sig: TAKE 1 TABLET(5 MG) BY MOUTH DAILY AS NEEDED FOR ERECTILE DYSFUNCTION     Dispense:  30 tablet     Refill:  5     Pharmacy update refills, keep on file, not requesting Rx to be filled today.        Follow Up:  Labs in 2 months.  Physical exam 1 year  Future Appointments   Date Time Provider Department Center   7/9/2025  7:45 AM LAB, [facility]   5/1/2026  7:00 AM [facility]   5/1/2026  8:15 AM [facility]   5/11/2026  2:20  Demarcus Snowden MD [facility]            Subjective:   Subjective   Chief Complaint   Patient presents with    Annual Exam       ARACELI Abarca is a 49 y.o. male.     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       Social Drivers of Health     Food Insecurity: Unknown (12/15/2023)    Hunger Vital Sign     Ran Out of Food in the Last Year: Never true   Transportation Needs: No Transportation Needs (12/15/2023)    PRAPARE - Transportation     Lack of Transportation (Medical): No     Lack of Transportation (Non-Medical): No   Physical Activity: Unknown (12/15/2023)    Exercise Vital Sign     Days of Exercise per Week: 3 days   Stress: No Stress Concern Present (12/15/2023)    [employer] [place] [employer] - Occupational Stress Questionnaire     Feeling of Stress : Not at all   Housing Stability: Low Risk  (12/15/2023)    Housing Stability Vital Sign     Unable to Pay for Housing in the Last Year: No     Number of Places Lived in the Last Year: 0     Unstable Housing in the Last Year: No       Last appointment with this clinic was Visit date not found. Last visit with me 5/8/2024   To summarize last visit and events leading up to today:  History of micro hematuria with negative workup.  Hypertension stable off of medications.  Diet controlled.    Mild chronic anemia  Vitiligo undergoing clinical study  2015 colonoscopy normal repeat 10 years  New role at work - more office based, no  longer in the field so less physical.      Saw NP 12/2023 for L testis pain. Treated for epididymitis with LQ  Saw urology in follow up.  Resolved, no additional testing    Last visit me 05/08/2024 for physical  Hypertension.  Weight gain with resultant elevated BP.  History of losartan HCT, history of low amlodipine.  Work on TLC but in the interim restart losartan HCT  Chronic microhematuria with negative workup historically.  Abnormal glucose new work on therapeutic lifestyle modification (TLC)    07/26/2024 ortho consult.  OA of the right knee.  X-ray, physical therapy.  X-ray DJD right greater than left small right effusion    12/2/24 colonoscopy sigmoid TA. Melanosis coli repeat 7-10 years    Pre visit labs   CBC mild anemia new   CMP normal   Lipid not ideal LDL of 153 non    A1c 5.8  Urinalysis 1+ protein.  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 He checks his blood pressure at home, noting readings around 130/80-85, sometimes as low as 70, measured twice daily. Tevin mentions knee pain attributed to past basketball injuries, describing significant cartilage degeneration. He takes 2 ibuprofen tablets in the morning with breakfast to manage the pain. Tevin acknowledges reduced adherence to his exercise routine lately, increased consumption of sweets, and drinking zero-sugar ginger ale.    Tevin denies seeing blood anywhere, including in spit, cough, urine, or stool. He also denies abdominal pain, abdominal pains, nausea when eating, chest pain, shortness of breath, or any other concerning symptoms during physical activity.    MEDICATIONS:  - Losartan HCTZ for blood pressure control  - Cialis, low dose, taken occasionally for erectile dysfunction  - Naproxen (Aleve),  "2 tablets in the morning with breakfast, for knee pain related to old basketball injuries      ROS:  Respiratory: no cough  Gastrointestinal: no abdominal pain, no nausea, no blood in stool  Genitourinary: no hematuria  Musculoskeletal: reports joint pain, reports bone pain         Patient Care Team:  Demarcus Nieves MD as PCP - General (Internal Medicine)  Shaniqua Edwards MD as Consulting Physician (Urology)  Erick Back MD as Consulting Physician (Dermatology)    Patient Active Problem List    Diagnosis Date Noted    Abnormal glucose 05/08/2024    Tubular adenoma of colon 12/2/24 colonoscopy sigmoid TA. Melanosis coli repeat 7-10 years 05/05/2023 5/6/2015 colonoscopy normal  12/2/24 colonoscopy sigmoid TA. Melanosis coli repeat 7-10 years      Mild chronic anemia 10/22/2020    Microhematuria ever since his 20s. 08/10/2017     11/24/2008 CT abdomen pelvis bilateral renal hypodensities too small to characterize but likely cysts.  12/02/2008 urine cytology negative for malignancy  12/08/2008 cystoscopy normal with no etiology of hematuria.      Essential hypertension 03/17/2013    Family history of diabetes mellitus 03/17/2013    Metatarsalgia 03/17/2013    Vitiligo 03/17/2013     Derm Dr. Back         PAST MEDICAL PROBLEMS, PAST SURGICAL HISTORY: please see relevant portions of the electronic medical record    ALLERGIES AND MEDICATIONS: updated and reviewed.  Medication List with Changes/Refills   Current Medications    LOSARTAN-HYDROCHLOROTHIAZIDE 100-25 MG (HYZAAR) 100-25 MG PER TABLET    TAKE 1 TABLET BY MOUTH DAILY    TADALAFIL (CIALIS) 5 MG TABLET    TAKE 1 TABLET(5 MG) BY MOUTH DAILY AS NEEDED FOR ERECTILE DYSFUNCTION         Objective:   Objective   Physical Exam   Vitals:    05/09/25 1602   BP: 136/70   BP Location: Right arm   Patient Position: Sitting   Pulse: 98   Temp: 98.7 °F (37.1 °C)   TempSrc: Oral   SpO2: 97%   Weight: (!) 151.2 kg (333 lb 5.4 oz)   Height: 6' 6" (1.981 m)    Body " mass index is 38.52 kg/m².            Physical Exam  Constitutional:       Appearance: Normal appearance. He is well-developed.   HENT:      Right Ear: Tympanic membrane and external ear normal.      Left Ear: Tympanic membrane and external ear normal.      Nose: Nose normal.   Eyes:      General: No scleral icterus.     Conjunctiva/sclera: Conjunctivae normal.   Neck:      Thyroid: No thyroid mass or thyromegaly.      Trachea: Trachea normal.   Cardiovascular:      Rate and Rhythm: Normal rate and regular rhythm.      Heart sounds: Normal heart sounds, S1 normal and S2 normal. No murmur heard.  Pulmonary:      Effort: Pulmonary effort is normal.      Breath sounds: Normal breath sounds.   Abdominal:      General: There is no distension.      Palpations: Abdomen is soft. There is no hepatomegaly, splenomegaly or mass.      Tenderness: There is no abdominal tenderness.   Musculoskeletal:         General: No deformity.      Right lower leg: No edema.      Left lower leg: No edema.   Lymphadenopathy:      Cervical: No cervical adenopathy.   Skin:     General: Skin is warm and dry.      Findings: No rash (on exposed skin).      Comments: On exposed skin  Vitiligo on several of the digits of his hands bilaterally   Neurological:      Mental Status: He is alert and oriented to person, place, and time.      Cranial Nerves: No cranial nerve deficit.      Sensory: No sensory deficit.      Deep Tendon Reflexes: Reflexes are normal and symmetric.   Psychiatric:         Speech: Speech normal.         Behavior: Behavior normal.         Thought Content: Thought content normal.         Judgment: Judgment normal.         Component      Latest Ref Rng 5/1/2024 5/1/2025   Sodium      136 - 145 mmol/L 139  138    Potassium      3.5 - 5.1 mmol/L 4.1  4.3    Chloride      95 - 110 mmol/L 104  104    CO2      23 - 29 mmol/L 28  25    Glucose      70 - 110 mg/dL 90  101    BUN      6 - 20 mg/dL 8  9    Creatinine      0.5 - 1.4 mg/dL 1.1   1.1    Calcium      8.7 - 10.5 mg/dL 9.4  9.3    PROTEIN TOTAL      6.0 - 8.4 gm/dL 7.0  7.1    Albumin      3.5 - 5.2 g/dL 3.9  3.9    BILIRUBIN TOTAL      0.1 - 1.0 mg/dL 1.4 (H)  1.2 (H)    ALP      40 - 150 unit/L 65  75    AST      11 - 45 unit/L 16  16    ALT      10 - 44 unit/L 14  14    eGFR      >60 mL/min/1.73/m2 >60.0  >60    Anion Gap      8 - 16 mmol/L 7 (L)  9    Specimen UA Urine, Clean Catch     Color, UA      Straw, Kathrin, Yellow, Light-Orange  Yellow  Yellow    Appearance, UA      Clear  Clear  Clear    pH, UA      5.0 - 8.0  7.0  8.0    Spec Grav UA      1.005 - 1.030  1.010  1.030    Protein, UA      Negative  Negative  1+ !    Glucose, UA      Negative  Negative  Negative    Ketones, UA      Negative  Negative  Negative    Bilirubin (UA)      Negative  Negative  Negative    Blood, UA      Negative  Negative  Negative    NITRITE UA      Negative  Negative  Negative    Leukocyte Esterase, UA      Negative  Negative  Negative    Urobilinogen, UA      <2.0 EU/dL  2.0-3.0 !    WBC      3.90 - 12.70 K/uL 4.03  5.20    RBC      4.60 - 6.20 M/uL 5.25  4.76    Hemoglobin      14.0 - 18.0 gm/dL 14.4  12.6 (L)    Hematocrit      40.0 - 54.0 % 46.0  41.1    MCV      82 - 98 fL 88  86    MCH      27.0 - 31.0 pg 27.4  26.5 (L)    MCHC      32.0 - 36.0 g/dL 31.3 (L)  30.7 (L)    RDW      11.5 - 14.5 % 15.2 (H)  14.5    Platelet Count      150 - 450 K/uL 227  281    MPV      9.2 - 12.9 fL 10.2  9.9    Cholesterol Total      120 - 199 mg/dL 208 (H)  212 (H)    Triglycerides      30 - 150 mg/dL 81  85    HDL      40 - 75 mg/dL 41  42    LDL Cholesterol      63.0 - 159.0 mg/dL 150.8  153.0    HDL/Cholesterol Ratio      20.0 - 50.0 % 19.7 (L)  19.8 (L)    Total Cholesterol/HDL Ratio      2.0 - 5.0  5.1 (H)  5.0    Non-HDL Cholesterol      mg/dL 167  170    RBC, UA      0 - 4 /HPF  4    WBC, UA      0 - 5 /HPF  1    Bacteria, UA      None, Rare, Occasional /HPF  Occasional    Squam Epithel, UA      /HPF  <1     Microscopic Comment  --    Hemoglobin A1C External      4.0 - 5.6 % 5.7 (H)  5.8 (H)    Estimated Avg Glucose      68 - 131 mg/dL 117  120       Legend:  (H) High  (L) Low  ! Abnormal

## 2025-05-09 NOTE — ASSESSMENT & PLAN NOTE
05/09/2025: Reports home readings systolic readings in the 130 range.  On losartan HCT without issues.  Refilled  Orders:    losartan-hydrochlorothiazide 100-25 mg (HYZAAR) 100-25 mg per tablet; Take 1 tablet by mouth once daily.

## 2025-05-16 ENCOUNTER — APPOINTMENT (OUTPATIENT)
Dept: URBAN - METROPOLITAN AREA CLINIC 321 | Facility: CLINIC | Age: 50
Setting detail: DERMATOLOGY
End: 2025-05-16

## 2025-05-16 DIAGNOSIS — L82.1 OTHER SEBORRHEIC KERATOSIS: ICD-10-CM | Status: STABLE

## 2025-05-16 DIAGNOSIS — L57.0 ACTINIC KERATOSIS: ICD-10-CM

## 2025-05-16 DIAGNOSIS — D22 MELANOCYTIC NEVI: ICD-10-CM | Status: STABLE

## 2025-05-16 DIAGNOSIS — D18.0 HEMANGIOMA: ICD-10-CM | Status: STABLE

## 2025-05-16 DIAGNOSIS — L81.4 OTHER MELANIN HYPERPIGMENTATION: ICD-10-CM | Status: STABLE

## 2025-05-16 PROBLEM — D22.4 MELANOCYTIC NEVI OF SCALP AND NECK: Status: ACTIVE | Noted: 2025-05-16

## 2025-05-16 PROBLEM — D22.5 MELANOCYTIC NEVI OF TRUNK: Status: ACTIVE | Noted: 2025-05-16

## 2025-05-16 PROBLEM — D18.01 HEMANGIOMA OF SKIN AND SUBCUTANEOUS TISSUE: Status: ACTIVE | Noted: 2025-05-16

## 2025-05-16 PROCEDURE — ? SUNSCREEN RECOMMENDATIONS

## 2025-05-16 PROCEDURE — ? TREATMENT REGIMEN

## 2025-05-16 PROCEDURE — 17000 DESTRUCT PREMALG LESION: CPT

## 2025-05-16 PROCEDURE — ? COUNSELING

## 2025-05-16 PROCEDURE — 99213 OFFICE O/P EST LOW 20 MIN: CPT | Mod: 25

## 2025-05-16 PROCEDURE — 17003 DESTRUCT PREMALG LES 2-14: CPT

## 2025-05-16 PROCEDURE — ? FULL BODY SKIN EXAM

## 2025-05-16 PROCEDURE — ? LIQUID NITROGEN

## 2025-05-16 PROCEDURE — ? ADDITIONAL NOTES

## 2025-05-16 ASSESSMENT — LOCATION ZONE DERM
LOCATION ZONE: TRUNK
LOCATION ZONE: FACE
LOCATION ZONE: SCALP
LOCATION ZONE: ARM

## 2025-05-16 ASSESSMENT — LOCATION SIMPLE DESCRIPTION DERM
LOCATION SIMPLE: RIGHT FOREHEAD
LOCATION SIMPLE: RIGHT BREAST
LOCATION SIMPLE: SCALP
LOCATION SIMPLE: ABDOMEN
LOCATION SIMPLE: LEFT TEMPLE
LOCATION SIMPLE: LEFT FOREHEAD
LOCATION SIMPLE: RIGHT TEMPLE
LOCATION SIMPLE: UPPER BACK
LOCATION SIMPLE: LEFT FOREARM
LOCATION SIMPLE: CHEST

## 2025-05-16 ASSESSMENT — LOCATION DETAILED DESCRIPTION DERM
LOCATION DETAILED: STERNAL NOTCH
LOCATION DETAILED: RIGHT CENTRAL TEMPLE
LOCATION DETAILED: RIGHT MEDIAL BREAST 2-3:00 REGION
LOCATION DETAILED: LEFT LATERAL TEMPLE
LOCATION DETAILED: RIGHT MEDIAL FOREHEAD
LOCATION DETAILED: RIGHT CENTRAL PARIETAL SCALP
LOCATION DETAILED: LEFT VENTRAL DISTAL FOREARM
LOCATION DETAILED: LEFT DISTAL ULNAR DORSAL FOREARM
LOCATION DETAILED: SUPERIOR THORACIC SPINE
LOCATION DETAILED: EPIGASTRIC SKIN
LOCATION DETAILED: LEFT FOREHEAD

## 2025-05-16 ASSESSMENT — TOTAL NUMBER OF LESIONS: # OF LESIONS?: 3

## 2025-05-16 NOTE — PROCEDURE: LIQUID NITROGEN
Number Of Freeze-Thaw Cycles: 2 freeze-thaw cycles
Duration Of Freeze Thaw-Cycle (Seconds): 10
Post-Care Instructions: I reviewed with the patient in detail post-care instructions. Patient is to wear sunprotection, and avoid picking at any of the treated lesions. Pt may apply Vaseline to crusted or scabbing areas.
Render Note In Bullet Format When Appropriate: Yes
Detail Level: Simple
Consent: The patient's consent was obtained including but not limited to risks of crusting, scabbing, blistering, scarring, darker or lighter pigmentary change, recurrence, incomplete removal and infection.
Aperture Size (Optional): C

## 2025-05-16 NOTE — PROCEDURE: ADDITIONAL NOTES
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 Clinically abnormal in appearance consistent with previous biopsy of blue nevus. Discussed if lesion grows, changes, develops new s/s it should be re-biopsied. No suspicious signs noted on dermoscopy today. 
Detail Level: Detailed
Render Risk Assessment In Note?: yes

## 2025-06-03 DIAGNOSIS — N52.9 MALE ERECTILE DISORDER: ICD-10-CM

## 2025-06-04 RX ORDER — TADALAFIL 5 MG/1
TABLET ORAL
Qty: 30 TABLET | Refills: 5 | Status: SHIPPED | OUTPATIENT
Start: 2025-06-04

## 2025-06-13 ENCOUNTER — OCCUPATIONAL HEALTH (OUTPATIENT)
Dept: URGENT CARE | Facility: CLINIC | Age: 50
End: 2025-06-13

## 2025-06-13 DIAGNOSIS — Z02.89 ENCOUNTER FOR EXAMINATION REQUIRED BY DEPARTMENT OF TRANSPORTATION (DOT): Primary | ICD-10-CM

## 2025-07-11 ENCOUNTER — LAB VISIT (OUTPATIENT)
Dept: LAB | Facility: HOSPITAL | Age: 50
End: 2025-07-11
Attending: INTERNAL MEDICINE
Payer: COMMERCIAL

## 2025-07-11 ENCOUNTER — PATIENT OUTREACH (OUTPATIENT)
Dept: ADMINISTRATIVE | Facility: HOSPITAL | Age: 50
End: 2025-07-11
Payer: COMMERCIAL

## 2025-07-11 DIAGNOSIS — Z00.00 NORMAL PHYSICAL EXAM: ICD-10-CM

## 2025-07-11 DIAGNOSIS — D64.9 ANEMIA, UNSPECIFIED TYPE: ICD-10-CM

## 2025-07-11 LAB
ERYTHROCYTE [DISTWIDTH] IN BLOOD BY AUTOMATED COUNT: 16.6 % (ref 11.5–14.5)
FERRITIN SERPL-MCNC: 16 NG/ML (ref 20–300)
HCT VFR BLD AUTO: 40.5 % (ref 40–54)
HGB BLD-MCNC: 12.7 GM/DL (ref 14–18)
IRON SATN MFR SERPL: 10 % (ref 20–50)
IRON SERPL-MCNC: 42 UG/DL (ref 45–160)
MCH RBC QN AUTO: 25.9 PG (ref 27–31)
MCHC RBC AUTO-ENTMCNC: 31.4 G/DL (ref 32–36)
MCV RBC AUTO: 83 FL (ref 82–98)
PLATELET # BLD AUTO: 271 K/UL (ref 150–450)
PMV BLD AUTO: 10.2 FL (ref 9.2–12.9)
RBC # BLD AUTO: 4.9 M/UL (ref 4.6–6.2)
RETICS/RBC NFR AUTO: 1.5 % (ref 0.4–2)
TIBC SERPL-MCNC: 414 UG/DL (ref 250–450)
TRANSFERRIN SERPL-MCNC: 280 MG/DL (ref 200–375)
WBC # BLD AUTO: 4.03 K/UL (ref 3.9–12.7)

## 2025-07-11 PROCEDURE — 82728 ASSAY OF FERRITIN: CPT

## 2025-07-11 PROCEDURE — 84466 ASSAY OF TRANSFERRIN: CPT

## 2025-07-11 PROCEDURE — 85027 COMPLETE CBC AUTOMATED: CPT

## 2025-07-11 PROCEDURE — 36415 COLL VENOUS BLD VENIPUNCTURE: CPT | Mod: PO

## 2025-07-11 PROCEDURE — 85045 AUTOMATED RETICULOCYTE COUNT: CPT

## 2025-07-14 ENCOUNTER — RESULTS FOLLOW-UP (OUTPATIENT)
Dept: FAMILY MEDICINE | Facility: CLINIC | Age: 50
End: 2025-07-14
Payer: COMMERCIAL

## 2025-07-14 DIAGNOSIS — D50.9 IRON DEFICIENCY ANEMIA, UNSPECIFIED IRON DEFICIENCY ANEMIA TYPE: Primary | ICD-10-CM

## 2025-07-21 ENCOUNTER — PATIENT MESSAGE (OUTPATIENT)
Dept: FAMILY MEDICINE | Facility: CLINIC | Age: 50
End: 2025-07-21
Payer: COMMERCIAL

## 2025-07-21 NOTE — PROGRESS NOTES
CBC anemia though stable compared to 05/01/2025   Retic count normal   But ferritin low 16.  Last for comparison was in 2020 and it was high at that time.    Iron sat is low   Had colonoscopy done just last year  Had noted no gross blood loss at last visit   Would check EGD   Start iron    I left voice message asking patient to call back.

## 2025-07-21 NOTE — TELEPHONE ENCOUNTER
I left voice message asking patient to call back.  His blood counts show continued anemia but it is not any worse.  Ferritin, which is a marker of iron stores, was extremely low.    He is iron-deficiency and that usually means blood loss.  But I am not sure where it is coming from.    He had a colonoscopy done last year and I do not think he needs to repeat 1 this year.    But I would check an EGD to check for stomach ulcers etc..    He would also need to start taking an iron supplement.    Please try and call the patient again to notify him of the results.    When he is notified please send back to me and I will send in EGD orders and orders for iron supplement

## 2025-07-22 ENCOUNTER — TELEPHONE (OUTPATIENT)
Dept: FAMILY MEDICINE | Facility: CLINIC | Age: 50
End: 2025-07-22
Payer: COMMERCIAL

## 2025-07-22 ENCOUNTER — CLINICAL SUPPORT (OUTPATIENT)
Dept: ENDOSCOPY | Facility: HOSPITAL | Age: 50
End: 2025-07-22
Attending: INTERNAL MEDICINE
Payer: COMMERCIAL

## 2025-07-22 ENCOUNTER — TELEPHONE (OUTPATIENT)
Dept: ENDOSCOPY | Facility: HOSPITAL | Age: 50
End: 2025-07-22
Payer: COMMERCIAL

## 2025-07-22 VITALS — BODY MASS INDEX: 36.45 KG/M2 | WEIGHT: 315 LBS | HEIGHT: 78 IN

## 2025-07-22 DIAGNOSIS — D50.9 IRON DEFICIENCY ANEMIA, UNSPECIFIED IRON DEFICIENCY ANEMIA TYPE: ICD-10-CM

## 2025-07-22 RX ORDER — FERROUS SULFATE 325(65) MG
325 TABLET ORAL
Qty: 90 TABLET | Refills: 0 | Status: SHIPPED | OUTPATIENT
Start: 2025-07-22

## 2025-07-22 NOTE — PATIENT INSTRUCTIONS
EGD Procedure Prep Instructions      Date of procedure: 9/5/2025 Arrive at: 8:20AM      Location of Department: [facility] [place] [address]  Take the [facility] Elevators to 4th Floor Endoscopy Lab    How to prep:    Day Before Procedure: 9/4/2025     You may have a light evening meal.   No solid food after 7:00 pm.   Continue drinking clear liquids.       Day of the Procedure:  9/5/2025     You may have water/clear liquids until 4 hours before your procedure or as directed by the scheduling nurse  5:20AM. See below for list.    What You CANNOT do:   Do not drink milk or anything colored red.  Do not drink alcohol.  No gum chewing or candy morning of procedure.    Liquids That Are OK to Drink:   Water  Sports drinks (Gatorade, Power-Aid)  Coffee or tea (no cream or nondairy creamer)  Clear juices without pulp (apple, white grape)  Gelatin desserts (no fruit or toppings)  Clear soda (sprite, coke, ginger ale)  Chicken broth (until 12 midnight the night before procedure)         IMPORTANT INFORMATION TO KNOW BEFORE YOUR PROCEDURE    [facility] 4th Floor     If your procedure requires the administration of anesthesia, it is necessary for a responsible adult to drive you home. The designated adult is strongly encouraged to remain in the endoscopy area until the patient is discharged. (Medical Transportation, Uber, Lyft, Taxi, etc. may ONLY be used if a responsible adult is present to accompany you home. The responsible adult CANNOT be the  of the service.)     person must be available to return to pick you up within 15 minutes of being notified of discharge.     Please bring a picture ID, insurance card, and copayment.     Take Medications as directed below:          If you begin taking any blood thinning medications, injectable weight loss/diabetes medications (other than insulin) , Adipex (Phentermine) , please contact the endoscopy scheduling department listed  below as soon as possible.    If you are diabetic see the attached instruction sheet regarding your medication.     If you take HEART, BLOOD PRESSURE, SEIZURE, PAIN, LUNG (including inhalers/nebulizers), ANTI-REJECTION (transplant patients), or PSYCHIATRIC medications, please take at your regular times with a sip of water or as directed by the scheduling nurse.     Important contact information:    Endoscopy [phone] Hours of operation Monday-Friday 8:00-4:30pm.    Questions about insurance or financial obligations call [phone] or [phone].    If you have questions regarding the prep or need to reschedule, please call [phone]. After hours questions requiring immediate assistance, contact [facility] nurse line at [phone] or [phone].   NOTE:     On occasion, unforeseen circumstances may cause a delay in your procedure start time. We respect your time and appreciate your patience during these circumstances.      Comments:

## 2025-07-22 NOTE — TELEPHONE ENCOUNTER
Dear Tevin Velazquez,     We attempted to reach you by phone, we have received a request to schedule an endoscopy procedure. Upper Endoscopy (EGD)   Please call [phone] Monday-Friday 8 am-4 pm and someone will be available to assist in reviewing medical history and scheduling your appointment.     Thank You,     [facility] Endoscopy Scheduling Department

## 2025-07-22 NOTE — TELEPHONE ENCOUNTER
----- Message from Demarcus Nieves MD sent at 7/21/2025  5:46 PM CDT -----      ----- Message -----  From: Lab, Background User  Sent: 7/11/2025   4:47 PM CDT  To: Demarcus Nieves MD

## 2025-07-22 NOTE — TELEPHONE ENCOUNTER
Iron prescription sent and EGD ordered can we assist in scheduling the intake phone call for the EGD? Urgent if possible

## 2025-07-22 NOTE — TELEPHONE ENCOUNTER
Spoke with the patient. Conveyed the finding of Iron profile, and informed that he needs iron supplement and EGD will be scheduled to rule out any stomach pathology. Agreed to start iron supplement and schedule the endoscopy.

## 2025-07-22 NOTE — PLAN OF CARE
Patient is scheduled for a Colonoscopy on 9/5/2025 with Dr. Viera  Referral for procedure from PAT appointment

## 2025-07-22 NOTE — TELEPHONE ENCOUNTER
Contacted the patient to schedule an endoscopy procedure(s):  Upper Endoscopy (EGD). The patient did not answer the call. Voice message left requesting a call back.

## 2025-07-30 ENCOUNTER — TELEPHONE (OUTPATIENT)
Dept: ENDOSCOPY | Facility: HOSPITAL | Age: 50
End: 2025-07-30
Payer: COMMERCIAL

## 2025-07-30 NOTE — TELEPHONE ENCOUNTER
Patient is scheduled for a Upper Endoscopy (EGD) on 8/4/25 with Dr. PINO Ellis  Referral for procedure from  [facility] referral (see Appts tab)

## 2025-07-31 DIAGNOSIS — I10 ESSENTIAL HYPERTENSION: ICD-10-CM

## 2025-07-31 RX ORDER — LOSARTAN POTASSIUM AND HYDROCHLOROTHIAZIDE 25; 100 MG/1; MG/1
1 TABLET ORAL
Qty: 90 TABLET | Refills: 3 | Status: SHIPPED | OUTPATIENT
Start: 2025-07-31

## 2025-08-04 NOTE — H&P
Short Stay Endoscopy History and Physical    PCP - Demarcus Nieves MD     Procedure - EGD  ASA - per anesthesia  Mallampati - per anesthesia  History of Anesthesia problems - no  Family history Anesthesia problems -  no   Plan of anesthesia - General    HPI:  This is a 49 y.o. male here for evaluation of :     CHERIE    ROS:  Constitutional: No fevers, chills, No weight loss  CV: No chest pain  Pulm: No cough, No shortness of breath  Ophtho: No vision changes  GI: see HPI  Derm: No rash    Medical History:  has a past medical history of Hypertension.    Surgical History:  has a past surgical history that includes knee arthro (Right) and Colonoscopy (N/A, 12/2/2024).    Family History: family history includes Autism in his son; Benign prostatic hyperplasia in his brother; Diabetes in his sister; Gallbladder disease in his brother; Glaucoma in his mother; Hypertension in his brother, father, and mother; Melanoma in his father; No Known Problems in his maternal aunt, maternal grandfather, maternal grandmother, maternal uncle, paternal aunt, paternal grandfather, paternal grandmother, paternal uncle, son, and son; Ovarian cysts in his mother; Thyroid disease in his sister.. Otherwise no colon cancer, inflammatory bowel disease, or GI malignancies.    Social History:  reports that he has never smoked. He has never used smokeless tobacco. He reports current alcohol use. He reports that he does not use drugs.    Review of patient's allergies indicates:  No Known Allergies    Medications:   Prescriptions Prior to Admission[1]    Physical Exam:    Vital Signs: There were no vitals filed for this visit.    Gen: NAD, lying comfortably  HENT: NCAT, oropharynx clear  Eyes: anicteric sclerae, EOMI grossly  Neck: supple, no visible masses/goiter  Cardiac: RRR  Lungs: non-labored breathing  Abd: soft, NT/ND, normoactive BS  Ext: no LE edema, warm, well perfused  Skin: skin intact on exposed body parts, no visible rashes,  lesions  Neuro: A&Ox4, neuro exam grossly intact, moves all extremities  Psych: appropriate mood, affect      Labs:  Lab Results   Component Value Date    WBC 4.03 07/11/2025    HGB 12.7 (L) 07/11/2025    HCT 40.5 07/11/2025     07/11/2025    CHOL 212 (H) 05/01/2025    TRIG 85 05/01/2025    HDL 42 05/01/2025    ALT 14 05/01/2025    AST 16 05/01/2025     05/01/2025    K 4.3 05/01/2025     05/01/2025    CREATININE 1.1 05/01/2025    BUN 9 05/01/2025    CO2 25 05/01/2025    TSH 1.113 04/30/2022    PSA 0.66 03/28/2016    HGBA1C 5.8 (H) 05/01/2025       Plan:  EGD for CHERIE    I have explained the risks and benefits of endoscopy procedures to the patient including but not limited to bleeding, perforation, infection, and death.  The patient was asked if they understand and allowed to ask any further questions to their satisfaction.      Cele Ellis MD         [1]   No medications prior to admission.

## 2025-08-05 ENCOUNTER — HOSPITAL ENCOUNTER (OUTPATIENT)
Facility: HOSPITAL | Age: 50
Discharge: HOME | End: 2025-08-05
Attending: INTERNAL MEDICINE | Admitting: INTERNAL MEDICINE
Payer: COMMERCIAL

## 2025-08-05 ENCOUNTER — ANESTHESIA EVENT (OUTPATIENT)
Dept: ENDOSCOPY | Facility: HOSPITAL | Age: 50
End: 2025-08-05
Payer: COMMERCIAL

## 2025-08-05 ENCOUNTER — ANESTHESIA (OUTPATIENT)
Dept: ENDOSCOPY | Facility: HOSPITAL | Age: 50
End: 2025-08-05
Payer: COMMERCIAL

## 2025-08-05 VITALS
HEART RATE: 83 BPM | SYSTOLIC BLOOD PRESSURE: 128 MMHG | OXYGEN SATURATION: 100 % | RESPIRATION RATE: 15 BRPM | DIASTOLIC BLOOD PRESSURE: 87 MMHG | TEMPERATURE: 98 F

## 2025-08-05 DIAGNOSIS — D50.9 IRON DEFICIENCY ANEMIA, UNSPECIFIED IRON DEFICIENCY ANEMIA TYPE: Primary | ICD-10-CM

## 2025-08-05 DIAGNOSIS — D50.9 IRON DEFICIENCY ANEMIA: ICD-10-CM

## 2025-08-05 PROCEDURE — 25000003 PHARM REV CODE 250: Performed by: STUDENT IN AN ORGANIZED HEALTH CARE EDUCATION/TRAINING PROGRAM

## 2025-08-05 PROCEDURE — 43239 EGD BIOPSY SINGLE/MULTIPLE: CPT | Mod: ,,, | Performed by: INTERNAL MEDICINE

## 2025-08-05 PROCEDURE — 63600175 PHARM REV CODE 636 W HCPCS: Performed by: STUDENT IN AN ORGANIZED HEALTH CARE EDUCATION/TRAINING PROGRAM

## 2025-08-05 PROCEDURE — 37000009 HC ANESTHESIA EA ADD 15 MINS: Performed by: INTERNAL MEDICINE

## 2025-08-05 PROCEDURE — 43239 EGD BIOPSY SINGLE/MULTIPLE: CPT | Performed by: INTERNAL MEDICINE

## 2025-08-05 PROCEDURE — 27201012 HC FORCEPS, HOT/COLD, DISP: Performed by: INTERNAL MEDICINE

## 2025-08-05 PROCEDURE — 37000008 HC ANESTHESIA 1ST 15 MINUTES: Performed by: INTERNAL MEDICINE

## 2025-08-05 PROCEDURE — 88341 IMHCHEM/IMCYTCHM EA ADD ANTB: CPT | Mod: TC | Performed by: INTERNAL MEDICINE

## 2025-08-05 RX ORDER — LIDOCAINE HYDROCHLORIDE 20 MG/ML
INJECTION INTRAVENOUS
Status: DISCONTINUED | OUTPATIENT
Start: 2025-08-05 | End: 2025-08-05

## 2025-08-05 RX ORDER — SODIUM CHLORIDE 9 MG/ML
INJECTION, SOLUTION INTRAVENOUS CONTINUOUS
Status: DISCONTINUED | OUTPATIENT
Start: 2025-08-05 | End: 2025-08-05 | Stop reason: HOSPADM

## 2025-08-05 RX ORDER — KETAMINE HYDROCHLORIDE 100 MG/ML
INJECTION, SOLUTION INTRAMUSCULAR; INTRAVENOUS
Status: COMPLETED
Start: 2025-08-05 | End: 2025-08-05

## 2025-08-05 RX ORDER — LIDOCAINE HYDROCHLORIDE 10 MG/ML
1 INJECTION, SOLUTION EPIDURAL; INFILTRATION; INTRACAUDAL; PERINEURAL ONCE
Status: DISCONTINUED | OUTPATIENT
Start: 2025-08-05 | End: 2025-08-05 | Stop reason: HOSPADM

## 2025-08-05 RX ORDER — PROPOFOL 10 MG/ML
VIAL (ML) INTRAVENOUS
Status: DISCONTINUED | OUTPATIENT
Start: 2025-08-05 | End: 2025-08-05

## 2025-08-05 RX ORDER — GLYCOPYRROLATE 0.2 MG/ML
INJECTION INTRAMUSCULAR; INTRAVENOUS
Status: DISCONTINUED
Start: 2025-08-05 | End: 2025-08-05 | Stop reason: HOSPADM

## 2025-08-05 RX ORDER — KETAMINE HYDROCHLORIDE 100 MG/ML
INJECTION, SOLUTION INTRAMUSCULAR; INTRAVENOUS
Status: DISCONTINUED | OUTPATIENT
Start: 2025-08-05 | End: 2025-08-05

## 2025-08-05 RX ORDER — PROPOFOL 10 MG/ML
VIAL (ML) INTRAVENOUS
Status: DISCONTINUED
Start: 2025-08-05 | End: 2025-08-05 | Stop reason: HOSPADM

## 2025-08-05 RX ORDER — LIDOCAINE HYDROCHLORIDE 20 MG/ML
INJECTION, SOLUTION EPIDURAL; INFILTRATION; INTRACAUDAL; PERINEURAL
Status: DISCONTINUED
Start: 2025-08-05 | End: 2025-08-05 | Stop reason: HOSPADM

## 2025-08-05 RX ADMIN — LIDOCAINE HYDROCHLORIDE 140 MG: 20 INJECTION, SOLUTION INTRAVENOUS at 09:08

## 2025-08-05 RX ADMIN — KETAMINE HYDROCHLORIDE 30 MG: 100 INJECTION, SOLUTION, CONCENTRATE INTRAMUSCULAR; INTRAVENOUS at 09:08

## 2025-08-05 RX ADMIN — SODIUM CHLORIDE: 0.9 INJECTION, SOLUTION INTRAVENOUS at 08:08

## 2025-08-05 RX ADMIN — PROPOFOL 50 MG: 10 INJECTION, EMULSION INTRAVENOUS at 09:08

## 2025-08-05 RX ADMIN — KETAMINE HYDROCHLORIDE 10 MG: 100 INJECTION, SOLUTION, CONCENTRATE INTRAMUSCULAR; INTRAVENOUS at 09:08

## 2025-08-05 RX ADMIN — PROPOFOL 30 MG: 10 INJECTION, EMULSION INTRAVENOUS at 09:08

## 2025-08-05 RX ADMIN — PROPOFOL 70 MG: 10 INJECTION, EMULSION INTRAVENOUS at 09:08

## 2025-08-05 RX ADMIN — GLYCOPYRROLATE 0.2 MG: 0.2 INJECTION, SOLUTION INTRAMUSCULAR; INTRAVITREAL at 08:08

## 2025-08-05 NOTE — ANESTHESIA POSTPROCEDURE EVALUATION
Anesthesia Post Evaluation    Patient: Tevin Velazquez    Procedure(s) Performed: Procedure(s) (LRB):  EGD (ESOPHAGOGASTRODUODENOSCOPY) (N/A)    Final Anesthesia Type: general      Patient location during evaluation: GI PACU  Patient participation: Yes- Able to Participate  Level of consciousness: awake and alert, oriented and awake  Post-procedure vital signs: reviewed and stable  Airway patency: patent    PONV status at discharge: No PONV  Anesthetic complications: no      Cardiovascular status: blood pressure returned to baseline  Respiratory status: unassisted, spontaneous ventilation and room air  Hydration status: euvolemic  Follow-up not needed.              Vitals Value Taken Time   /87 08/05/25 09:50   Temp 36.5 °C (97.7 °F) 08/05/25 09:20   Pulse 83 08/05/25 09:50   Resp 15 08/05/25 09:20   SpO2 100 % 08/05/25 09:50         Event Time   Out of Recovery 09:55:40         Pain/Ben Score: Ben Score: 10 (8/5/2025  9:50 AM)

## 2025-08-05 NOTE — PROVATION PATIENT INSTRUCTIONS
Discharge Summary/Instructions after an Endoscopic Procedure  Patient Name: Tevin Velazquez  Patient MRN: [id number]  Patient YOB: 1975  Tuesday, August 5, 2025  Cele Ellis MD  Dear patient,  As a result of recent federal legislation (The Federal Cures Act), you may   receive lab or pathology results from your procedure in your [facility]   account before your physician is able to contact you. Your physician or   their representative will relay the results to you with their   recommendations at their soonest availability.  Thank you,  RESTRICTIONS:  During your procedure today, you received medications for sedation.  These   medications may affect your judgment, balance and coordination.  Therefore,   for 24 hours, you have the following restrictions:   - DO NOT drive a car, operate machinery, make legal/financial decisions,   sign important papers or drink alcohol.    ACTIVITY:  Today: no heavy lifting, straining or running due to procedural   sedation/anesthesia.  The following day: return to full activity including work.  DIET:  Eat and drink normally unless instructed otherwise.     TREATMENT FOR COMMON SIDE EFFECTS:  - Mild abdominal pain, nausea, belching, bloating or excessive gas:  rest,   eat lightly and use a heating pad.  - Sore Throat: treat with throat lozenges and/or gargle with warm salt   water.  - Because air was used during the procedure, expelling large amounts of air   from your rectum or belching is normal.  - If a bowel prep was taken, you may not have a bowel movement for 1-3 days.    This is normal.  SYMPTOMS TO WATCH FOR AND REPORT TO YOUR PHYSICIAN:  1. Abdominal pain or bloating, other than gas cramps.  2. Chest pain.  3. Back pain.  4. Signs of infection such as: chills or fever occurring within 24 hours   after the procedure.  5. Rectal bleeding, which would show as bright red, maroon, or black stools.   (A tablespoon of blood from the rectum is not serious, especially  if   hemorrhoids are present.)  6. Vomiting.  7. Weakness or dizziness.  GO DIRECTLY TO THE NEAREST EMERGENCY ROOM IF YOU HAVE ANY OF THE FOLLOWING:      Difficulty breathing              Chills and/or fever over 101 F   Persistent vomiting and/or vomiting blood   Severe abdominal pain   Severe chest pain   Black, tarry stools   Bleeding- more than one tablespoon   Any other symptom or condition that you feel may need urgent attention  Your doctor recommends these additional instructions:  If any biopsies were taken, your doctors clinic will contact you in 1 to 2   weeks with any results.  - Discharge patient to home.   - Resume previous diet.   - Continue present medications.   - Await pathology results.  For questions, problems or results please call your physician - Cele Ellis MD at Work:  [phone].  [facility] can be reached at [phone]     IF A COMPLICATION OR EMERGENCY SITUATION ARISES AND YOU ARE UNABLE TO REACH   YOUR PHYSICIAN - GO DIRECTLY TO THE EMERGENCY ROOM.  Cele Ellis MD  8/5/2025 9:19:38 AM  This report has been verified and signed electronically.  Dear patient,  As a result of recent federal legislation (The Federal Cures Act), you may   receive lab or pathology results from your procedure in your [facility]   account before your physician is able to contact you. Your physician or   their representative will relay the results to you with their   recommendations at their soonest availability.  Thank you,  PROVATION

## 2025-08-05 NOTE — TRANSFER OF CARE
Anesthesia Transfer of Care Note    Patient: Tevin Velazquez    Procedure(s) Performed: Procedure(s) (LRB):  EGD (ESOPHAGOGASTRODUODENOSCOPY) (N/A)    Patient location: GI    Anesthesia Type: general    Transport from OR: Transported from OR on room air with adequate spontaneous ventilation    Post pain: adequate analgesia    Post assessment: no apparent anesthetic complications    Post vital signs: stable    Level of consciousness: responds to stimulation    Nausea/Vomiting: no nausea/vomiting    Complications: none    Transfer of care protocol was followed      Last vitals: Visit Vitals  /81 (BP Location: Left arm, Patient Position: Lying)   Pulse 88   Temp 36.5 °C (97.7 °F) (Oral)   Resp 15   SpO2 96%

## 2025-08-05 NOTE — ANESTHESIA PREPROCEDURE EVALUATION
08/05/2025  Tevin Velazquez is a 49 y.o., male.  Past Medical History:   Diagnosis Date    Hypertension          Pre-op Assessment    I have reviewed the Patient Summary Reports.     I have reviewed the Nursing Notes. I have reviewed the NPO Status.      Review of Systems  Hematology/Oncology:       -- Anemia:                                  EENT/Dental:  EENT/Dental Normal           Cardiovascular:  Exercise tolerance: good   Hypertension                                    Hypertension         Pulmonary:  Pulmonary Normal                       Renal/:  Renal/ Normal                 Hepatic/GI:  Hepatic/GI Normal                    Neurological:  Neurology Normal                                      Endocrine:  Endocrine Normal            Dermatological:  vitiligo   Psych:  Psychiatric Normal                    Physical Exam  General: Well nourished    Airway:  Mallampati: II   Mouth Opening: Normal  Tongue: Normal  Neck ROM: Normal ROM    Dental:  Intact    Chest/Lungs:  Clear to auscultation    Heart:  Rate: Normal  Rhythm: Regular Rhythm  Sounds: Normal      Anesthesia Plan  Type of Anesthesia, risks & benefits discussed:    Anesthesia Type: Gen Natural Airway  Intra-op Monitoring Plan: Standard ASA Monitors  Induction:  IV  Informed Consent: Informed consent signed with the Patient and all parties understand the risks and agree with anesthesia plan.  All questions answered.   ASA Score: 2    Ready For Surgery From Anesthesia Perspective.     .

## 2025-08-11 ENCOUNTER — TELEPHONE (OUTPATIENT)
Dept: GASTROENTEROLOGY | Facility: CLINIC | Age: 50
End: 2025-08-11
Payer: COMMERCIAL

## 2025-08-11 DIAGNOSIS — A04.8 H. PYLORI INFECTION: Primary | ICD-10-CM

## 2025-08-11 LAB
ESTROGEN SERPL-MCNC: NORMAL PG/ML
INSULIN SERPL-ACNC: NORMAL U[IU]/ML
LAB AP CLINICAL INFORMATION: NORMAL
LAB AP GROSS DESCRIPTION: NORMAL
LAB AP PERFORMING LOCATION(S): NORMAL
LAB AP REPORT FOOTNOTES: NORMAL
T3RU NFR SERPL: NORMAL %

## 2025-08-11 RX ORDER — BISMUTH SUBSALICYLATE 262 MG/1
262 TABLET ORAL 4 TIMES DAILY
Qty: 56 TABLET | Refills: 0 | Status: SHIPPED | OUTPATIENT
Start: 2025-08-11 | End: 2025-08-25

## 2025-08-11 RX ORDER — METRONIDAZOLE 250 MG/1
250 TABLET ORAL EVERY 6 HOURS
Qty: 56 TABLET | Refills: 0 | Status: SHIPPED | OUTPATIENT
Start: 2025-08-11 | End: 2025-08-25

## 2025-08-11 RX ORDER — DOXYCYCLINE 100 MG/1
100 CAPSULE ORAL 2 TIMES DAILY
Qty: 28 CAPSULE | Refills: 0 | Status: SHIPPED | OUTPATIENT
Start: 2025-08-11 | End: 2025-08-25

## 2025-08-11 RX ORDER — PANTOPRAZOLE SODIUM 40 MG/1
40 TABLET, DELAYED RELEASE ORAL 2 TIMES DAILY
Qty: 28 TABLET | Refills: 0 | Status: SHIPPED | OUTPATIENT
Start: 2025-08-11 | End: 2025-08-25